# Patient Record
Sex: FEMALE | Race: WHITE | HISPANIC OR LATINO | Employment: FULL TIME | URBAN - METROPOLITAN AREA
[De-identification: names, ages, dates, MRNs, and addresses within clinical notes are randomized per-mention and may not be internally consistent; named-entity substitution may affect disease eponyms.]

---

## 2017-02-07 ENCOUNTER — HOSPITAL ENCOUNTER (EMERGENCY)
Facility: MEDICAL CENTER | Age: 27
End: 2017-02-07
Attending: EMERGENCY MEDICINE
Payer: COMMERCIAL

## 2017-02-07 VITALS
OXYGEN SATURATION: 100 % | HEIGHT: 60 IN | HEART RATE: 86 BPM | RESPIRATION RATE: 16 BRPM | BODY MASS INDEX: 30.25 KG/M2 | DIASTOLIC BLOOD PRESSURE: 85 MMHG | SYSTOLIC BLOOD PRESSURE: 123 MMHG | WEIGHT: 154.1 LBS | TEMPERATURE: 98.1 F

## 2017-02-07 DIAGNOSIS — S46.811A TRAPEZIUS STRAIN, RIGHT, INITIAL ENCOUNTER: ICD-10-CM

## 2017-02-07 PROCEDURE — 99284 EMERGENCY DEPT VISIT MOD MDM: CPT

## 2017-02-07 PROCEDURE — 700102 HCHG RX REV CODE 250 W/ 637 OVERRIDE(OP): Performed by: EMERGENCY MEDICINE

## 2017-02-07 PROCEDURE — A9270 NON-COVERED ITEM OR SERVICE: HCPCS | Performed by: EMERGENCY MEDICINE

## 2017-02-07 RX ORDER — DIAZEPAM 5 MG/1
5 TABLET ORAL ONCE
Status: COMPLETED | OUTPATIENT
Start: 2017-02-07 | End: 2017-02-07

## 2017-02-07 RX ORDER — NAPROXEN 500 MG/1
500 TABLET ORAL 2 TIMES DAILY WITH MEALS
Qty: 20 TAB | Refills: 0 | Status: SHIPPED | OUTPATIENT
Start: 2017-02-07 | End: 2021-10-21

## 2017-02-07 RX ORDER — HYDROCODONE BITARTRATE AND ACETAMINOPHEN 5; 325 MG/1; MG/1
1 TABLET ORAL ONCE
Status: COMPLETED | OUTPATIENT
Start: 2017-02-07 | End: 2017-02-07

## 2017-02-07 RX ORDER — CYCLOBENZAPRINE HCL 10 MG
10 TABLET ORAL 3 TIMES DAILY PRN
Qty: 20 TAB | Refills: 0 | Status: SHIPPED | OUTPATIENT
Start: 2017-02-07 | End: 2021-10-21

## 2017-02-07 RX ORDER — OXYCODONE HYDROCHLORIDE AND ACETAMINOPHEN 5; 325 MG/1; MG/1
1-2 TABLET ORAL EVERY 4 HOURS PRN
Qty: 12 TAB | Refills: 0 | Status: SHIPPED | OUTPATIENT
Start: 2017-02-07 | End: 2021-10-21

## 2017-02-07 RX ADMIN — DIAZEPAM 5 MG: 5 TABLET ORAL at 21:17

## 2017-02-07 RX ADMIN — HYDROCODONE BITARTRATE AND ACETAMINOPHEN 1 TABLET: 5; 325 TABLET ORAL at 21:17

## 2017-02-07 ASSESSMENT — PAIN SCALES - GENERAL: PAINLEVEL_OUTOF10: 8

## 2017-02-07 ASSESSMENT — LIFESTYLE VARIABLES: DO YOU DRINK ALCOHOL: NO

## 2017-02-07 NOTE — ED AVS SNAPSHOT
2/7/2017          Tavia Dong  [address]  [place] [address]    Dear Tavia:    [facility] wants to ensure your discharge home is safe and you or your loved ones have had all your questions answered regarding your care after you leave the hospital.    You may receive a telephone call within two days of your discharge.  This call is to make certain you understand your discharge instructions as well as ensure we provided you with the best care possible during your stay with us.     The call will only last approximately 3-5 minutes and will be done by a nurse.    Once again, we want to ensure your discharge home is safe and that you have a clear understanding of any next steps in your care.  If you have any questions or concerns, please do not hesitate to contact us, we are here for you.  Thank you for choosing [facility] for your healthcare needs.    Sincerely,    Yordy Posadas    [facility] [place] [facility]

## 2017-02-07 NOTE — LETTER
FORM C-4:  EMPLOYEE’S CLAIM FOR COMPENSATION/ REPORT OF INITIAL TREATMENT  EMPLOYEE’S CLAIM - PROVIDE ALL INFORMATION REQUESTED   First Name  Tavia Last Name  Berna Birthdate             Age  1990 27 y.o. 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 [address]] Zip  [address]   Date of Injury  2/6/2017       Hour of Injury  5:30 PM Date Employer Notified  2/6/2017 Last Day of Work after Injury or Occupational Disease  2/7/2017 Supervisor to Whom Injury Reported  ОЛЕГ CORNELIUS   Address or Location of Accident (if applicable)  [address]   What were you doing at the time of accident? (if applicable)  MOVING AN ENTERTAINMENT CENTER    How did this injury or occupational disease occur? Be specific and answer in detail. Use additional sheet if necessary)  MOVING A MEDIA CENTER/FIREPLACE BY HAND CART   If you believe that you have an occupational disease, when did you first have knowledge of the disability and it relationship to your employment?  N/A Witnesses to the Accident  NONE     Nature of Injury or Occupational Disease  Strain  Part(s) of Body Injured or Affected  Lower Back Area (Lumbar Area & Lumbo-Sacral), Upper Back Area (Thoracic Area), N/A    I certify that the above is true and correct to the best of my knowledge and that I have provided this information in order to  obtain the benefits of [place] or [place]  I hereby authorize any physician, chiropractor, surgeon, practitioner, or other person, any hospital, including [facility] hospital, any medical service organization, any insurance company, or other institution or organization to release to each other, any medical or other information, including benefits paid or payable, pertinent to this injury or disease, except information relative to diagnosis, treatment and/or counseling for AIDS, psychological conditions, alcohol or controlled substances, for which I must give specific authorization.  A Photostat of this authorization shall be as valid as the original.   Date  2/7/2017 Place  [facility] Employee’s Signature   THIS REPORT MUST BE COMPLETED AND MAILED WITHIN 3 WORKING DAYS OF TREATMENT   Place  [facility], EMERGENCY DEPT  Name of Facility   [facility]   Date  2/7/2017 Diagnosis  (S46.811A) Trapezius strain, right, initial encounter Is there evidence the injured employee was under the influence of alcohol and/or another controlled substance at the time of accident?   Hour  9:25 PM Description of Injury or Disease  Trapezius strain, right, initial encounter No   Treatment  Naproxen, Flexeril, Norco  Have you advised the patient to remain off work five days or more?         No   X-Ray Findings    Comments:not indicated   If Yes   From Date    To Date      From information given by the employee, together with medical evidence, can you directly connect this injury or occupational disease as job incurred?  Yes If No, is the employee capable of: Full Duty  No Modified Duty  Yes   Is additional medical care by a physician indicated?  Yes If Modified Duty, Specify any Limitations / Restrictions  As instructed by occupational medicine     Do you know of any previous  "injury or disease contributing to this condition or occupational disease?  No   Date  2/7/2017 Print Doctor’s Name  Hebert Kern certify the employer’s copy of this form was mailed on:   Address  [address] [address] [phone] Insurer’s Use Only   [facility]  [id number]    Provider’s Tax ID Number  [id number] Telephone  Dept: [phone]    Doctor’s Signature  e-SignHEBERT KERN M.D. Degree   MD    Original - TREATING PHYSICIAN OR CHIROPRACTOR   Pg 2-Insurer/TPA   Pg 3-Employer   Pg 4-Employee                                                                                                  Form C-4 (rev01/03)     BRIEF DESCRIPTION OF RIGHTS AND BENEFITS  (Pursuant to [place] 616C.050)    Notice of Injury or Occupational Disease (Incident Report Form C-1): If an injury or occupational disease (OD) arises out of and in the course of employment, you must provide written notice to your employer as soon as practicable, but no later than 7 days after the accident or OD. Your employer shall maintain a sufficient supply of the required forms.    Claim for Compensation (Form C-4): If medical treatment is sought, the form C-4 is available at the place of initial treatment. A completed \"Claim for Compensation\" (Form C-4) must be filed within 90 days after an accident or OD. The treating physician or chiropractor must, within 3 working days after treatment, complete and mail to the employer, the employer's insurer and third-party , the Claim for Compensation.    Medical Treatment: If you require medical treatment for your on-the-job injury or OD, you may be required to select a physician or chiropractor from a list provided by your workers’ compensation insurer, if it has contracted with an Organization for Managed Care (MCO) or Preferred Provider Organization (PPO) or providers of health care. If your employer has not entered into a contract with an MCO or PPO, you may " select a physician or chiropractor from the Panel of Physicians and Chiropractors. Any medical costs related to your industrial injury or OD will be paid by your insurer.    Temporary Total Disability (TTD): If your doctor has certified that you are unable to work for a period of at least 5 consecutive days, or 5 cumulative days in a 20-day period, or places restrictions on you that your employer does not accommodate, you may be entitled to TTD compensation.    Temporary Partial Disability (TPD): If the wage you receive upon reemployment is less than the compensation for TTD to which you are entitled, the insurer may be required to pay you TPD compensation to make up the difference. TPD can only be paid for a maximum of 24 months.    Permanent Partial Disability (PPD): When your medical condition is stable and there is an indication of a PPD as a result of your injury or OD, within 30 days, your insurer must arrange for an evaluation by a rating physician or chiropractor to determine the degree of your PPD. The amount of your PPD award depends on the date of injury, the results of the PPD evaluation and your age and wage.    Permanent Total Disability (PTD): If you are medically certified by a treating physician or chiropractor as permanently and totally disabled and have been granted a PTD status by your insurer, you are entitled to receive monthly benefits not to exceed 66 2/3% of your average monthly wage. The amount of your PTD payments is subject to reduction if you previously received a PPD award.    Vocational Rehabilitation Services: You may be eligible for vocational rehabilitation services if you are unable to return to the job due to a permanent physical impairment or permanent restrictions as a result of your injury or occupational disease.    Transportation and Per Amirah Reimbursement: You may be eligible for travel expenses and per amirah associated with medical treatment.  Reopening: You may be able to  reopen your claim if your condition worsens after claim closure.    Appeal Process: If you disagree with a written determination issued by the insurer or the insurer does not respond to your request, you may appeal to the Department of Administration, , by following the instructions contained in your determination letter. You must appeal the determination within 70 days from the date of the determination letter at [address], [address], [place], [place] [address], or [address], [address], [place], [place] [address]. If you disagree with the  decision, you may appeal to the Department of Administration, . You must file your appeal within 30 days from the date of the  decision letter at [address], [address], [place], [place] [address], or [address], [place], [place] [address]. If you disagree with a decision of an , you may file a petition for judicial review with the District Court. You must do so within 30 days of the Appeal Officer’s decision. You may be represented by an  at your own expense or you may contact the [facility] for possible representation.    [place]  for Injured Workers (NAIW): If you disagree with a  decision, you may request that NAIW represent you without charge at an  Hearing. For information regarding denial of benefits, you may contact the [facility] at: [address], [address], [place] [address], [phone], or [address], [place] [address], [phone]    To File a Complaint with the Division: If you wish to file a complaint with the  of the Division of Industrial Relations (DIR), please contact the Workers’ Compensation Section, [address], [address], [place], [place] [address], telephone [phone], or [address], [place]  [address], telephone [phone].    For assistance with Workers’ Compensation Issues: you may contact the Office of the Governor Consumer Health Assistance, [address] [place] [address], [facility] [address], [place] [address], Toll Free 1-129.223.7199, Web site: [email or web address]., E-mail [email or web address]                                                                                                                                                                               __________________________________________________________________                                    _______2/7/2017__________            Employee Name / Signature                                                                                                                            Date                                       D-2 (rev. 10/07)

## 2017-02-07 NOTE — ED AVS SNAPSHOT
Home Care Instructions                                                                                                                Tavia Dong   MRN: [id number]    Department:  [facility], Emergency Dept   Date of Visit:  2/7/2017            [facility], Emergency Dept    [address]    [place] [address]    Phone:  [phone]      You were seen by     Hebert Kern M.D.      Your Diagnosis Was     Trapezius strain, right, initial encounter     S46.811A       These are the medications you received during your hospitalization from 02/07/2017 1954 to 02/07/2017 2136     Date/Time Order Dose Route Action    02/07/2017 2117 diazepam (VALIUM) tablet 5 mg 5 mg Oral Given    02/07/2017 2117 hydrocodone-acetaminophen (NORCO) 5-325 MG per tablet 1 Tab 1 Tab Oral Given      Follow-up Information     1. Follow up with [facility] In 2 days.    Contact information    [address]  [place] [address]  [phone]        Medication Information     Review all of your home medications and newly ordered medications with your primary doctor and/or pharmacist as soon as possible. Follow medication instructions as directed by your doctor and/or pharmacist.     Please keep your complete medication list with you and share with your physician. Update the information when medications are discontinued, doses are changed, or new medications (including over-the-counter products) are added; and carry medication information at all times in the event of emergency situations.               Medication List      START taking these medications        Instructions    cyclobenzaprine 10 MG Tabs   Commonly known as:  FLEXERIL    Take 1 Tab by mouth 3 times a day as needed.   Dose:  10 mg       naproxen 500 MG Tabs   Commonly known as:  NAPROSYN    Take 1 Tab by mouth 2 times a day, with meals.   Dose:  500 mg       oxycodone-acetaminophen 5-325 MG Tabs   Commonly known as:  PERCOCET       Take 1-2 Tabs by mouth every four hours as needed for Moderate Pain.   Dose:  1-2 Tab                 Discharge Instructions       Return immediately to the Emergency Department if you experience continuing or worsening discomfort in your back and neck, any numbness/weakness/tingling, abdominal pain, fever, chest pain, difficulty breathing or any other new or worsening symptoms.      Muscle Strain  A muscle strain is an injury that occurs when a muscle is stretched beyond its normal length. Usually a small number of muscle fibers are torn when this happens. Muscle strain is rated in degrees. First-degree strains have the least amount of muscle fiber tearing and pain. Second-degree and third-degree strains have increasingly more tearing and pain.   Usually, recovery from muscle strain takes 1-2 weeks. Complete healing takes 5-6 weeks.   CAUSES   Muscle strain happens when a sudden, violent force placed on a muscle stretches it too far. This may occur with lifting, sports, or a fall.   RISK FACTORS  Muscle strain is especially common in athletes.   SIGNS AND SYMPTOMS  At the site of the muscle strain, there may be:  · Pain.  · Bruising.  · Swelling.  · Difficulty using the muscle due to pain or lack of normal function.  DIAGNOSIS   Your health care provider will perform a physical exam and ask about your medical history.  TREATMENT   Often, the best treatment for a muscle strain is resting, icing, and applying cold compresses to the injured area.    HOME CARE INSTRUCTIONS   · Use the PRICE method of treatment to promote muscle healing during the first 2-3 days after your injury. The PRICE method involves:  ¨ Protecting the muscle from being injured again.  ¨ Restricting your activity and resting the injured body part.  ¨ Icing your injury. To do this, put ice in a plastic bag. Place a towel between your skin and the bag. Then, apply the ice and leave it on from 15-20 minutes each hour. After the third day, switch  to moist heat packs.  ¨ Apply compression to the injured area with a splint or elastic bandage. Be careful not to wrap it too tightly. This may interfere with blood circulation or increase swelling.  ¨ Elevate the injured body part above the level of your heart as often as you can.  · Only take over-the-counter or prescription medicines for pain, discomfort, or fever as directed by your health care provider.  · Warming up prior to exercise helps to prevent future muscle strains.  SEEK MEDICAL CARE IF:   · You have increasing pain or swelling in the injured area.  · You have numbness, tingling, or a significant loss of strength in the injured area.  MAKE SURE YOU:   · Understand these instructions.  · Will watch your condition.  · Will get help right away if you are not doing well or get worse.     This information is not intended to replace advice given to you by your health care provider. Make sure you discuss any questions you have with your health care provider.     Document Released: 12/18/2006 Document Revised: 10/08/2014 Document Reviewed: 07/17/2014  [employer] Interactive Patient Education ©2016 [employer]            Patient Information     Patient Information    Following emergency treatment: all patient requiring follow-up care must return either to a private physician or a clinic if your condition worsens before you are able to obtain further medical attention, please return to the emergency room.     Billing Information    At [facility], we work to make the billing process streamlined for our patients.  Our Representatives are here to answer any questions you may have regarding your hospital bill.  If you have insurance coverage and have supplied your insurance information to us, we will submit a claim to your insurer on your behalf.  Should you have any questions regarding your bill, we can be reached online or by phone as follows:  Online: You are able pay your bills online or live chat with our  representatives about any billing questions you may have. We are here to help Monday - Friday from 8:00am to 7:30pm and 9:00am - 12:00pm on Saturdays.  Please visit [email or web address]  for more information.   Phone:  [phone] or [phone]    Please note that your emergency physician, surgeon, pathologist, radiologist, anesthesiologist, and other specialists are not employed by [facility] and will therefore bill separately for their services.  Please contact them directly for any questions concerning their bills at the numbers below:     Emergency Physician Services:  [phone]  [facility]:  [phone]  [facility]:  [phone]  [facility]:  [phone]    1. Your final bill may vary from the amount quoted upon discharge if all procedures are not complete at that time, or if your doctor has additional procedures of which we are not aware. You will receive an additional bill if you return to the Emergency Department at [facility] for suture removal regardless of the facility of which the sutures were placed.     2. Please arrange for settlement of this account at the emergency registration.    3. All self-pay accounts are due in full at the time of treatment.  If you are unable to meet this obligation then payment is expected within 4-5 days.     4. If you have had radiology studies (CT, X-ray, Ultrasound, MRI), you have received a preliminary result during your emergency department visit. Please contact the radiology department [phone] to receive a copy of your final result. Please discuss the Final result with your primary physician or with the follow up physician provided.     Crisis Hotline:  [place] Crisis Hotline:  8-538-VJHUXON or 1-318.630.3897  [place] Crisis Hotline:    1-887.190.7386 or 477-337-4767         ED Discharge Follow Up Questions    1. In order to provide you with very good care, we would  like to follow up with a phone call in the next few days.  May we have your permission to contact you?     YES /  NO    2. What is the best phone number to call you? (       )_____-__________    3. What is the best time to call you?      Morning  /  Afternoon  /  Evening                   Patient Signature:  ____________________________________________________________    Date:  ____________________________________________________________

## 2017-02-07 NOTE — ED AVS SNAPSHOT
[employer] Access Code: [email or web address]  Expires: 3/9/2017  9:36 PM    [employer]  A secure, online tool to manage your health information     [employer]s [employer]® is a secure, online tool that connects you to your personalized health information from the privacy of your home -- day or night - making it very easy for you to manage your healthcare. Once the activation process is completed, you can even access your medical information using the [employer] margy, which is available for free in the Apple Margy store or Google Play store.     [employer] provides the following levels of access (as shown below):   My Chart Features   [facility] Primary Care Doctor [facility]  Specialists [facility]  Urgent  Care Non-[facility]  Primary Care  Doctor   Email your healthcare team securely and privately 24/7 X X X X   Manage appointments: schedule your next appointment; view details of past/upcoming appointments X      Request prescription refills. X      View recent personal medical records, including lab and immunizations X X X X   View health record, including health history, allergies, medications X X X X   Read reports about your outpatient visits, procedures, consult and ER notes X X X X   See your discharge summary, which is a recap of your hospital and/or ER visit that includes your diagnosis, lab results, and care plan. X X       How to register for [employer]:  1. Go to  [email or web address].  2. Click on the Sign Up Now box, which takes you to the New Member Sign Up page. You will need to provide the following information:  a. Enter your [employer] Access Code exactly as it appears at the top of this page. (You will not need to use this code after you’ve completed the sign-up process. If you do not sign up before the expiration date, you must request a new code.)   b. Enter your date of birth.   c. Enter your home email address.   d. Click Submit, and follow the next screen’s instructions.  3. Create a [employer] ID. This will be your [employer]  login ID and cannot be changed, so think of one that is secure and easy to remember.  4. Create a "[employer]" password. You can change your password at any time.  5. Enter your Password Reset Question and Answer. This can be used at a later time if you forget your password.   6. Enter your e-mail address. This allows you to receive e-mail notifications when new information is available in "[employer]".  7. Click Sign Up. You can now view your health information.    For assistance activating your "[employer]" account, call [phone]

## 2017-02-08 NOTE — ED PROVIDER NOTES
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  pain has become increasingly worse.       HPI  Tavia Dong is a 27 y.o. female who presents for evaluation of right-sided neck and upper back pain that began yesterday while she was moving some heavy stuff at work. She reports the pain wasn't so bad yesterday but after waking up this morning it's been much worse. No focal weakness numbness or tingling. No low back pain. No chest pain or abdominal pain. She reports a stiffness is progressively severe as well.    REVIEW OF SYSTEMS  Negative for fever, rash, chest pain, dyspnea, abdominal pain.    PAST MEDICAL HISTORY   has a past medical history of GERD (gastroesophageal reflux disease) and Headache(784.0).    SOCIAL HISTORY  Social History     Social History Main Topics   • Smoking status: Never Smoker    • Smokeless tobacco: Not on file   • Alcohol Use: No   • Drug Use: No   • Sexual Activity:     Partners: Male      Comment: none       SURGICAL HISTORY   has past surgical history that includes cholecystectomy (2013).    CURRENT MEDICATIONS  I personally reviewed the medication list in the charting documentation.     ALLERGIES  No Known Allergies    PHYSICAL EXAM  VITAL SIGNS: /85 mmHg  Pulse 77  Temp(Src) 36.7 °C (98.1 °F)  Resp 16  Ht 1.524 m (5')  Wt 69.9 kg (154 lb 1.6 oz)  BMI 30.10 kg/m2  SpO2 100%  LMP 08/20/2014  Constitutional: Alert in no apparent distress.  HENT: No signs of trauma.   Neck: Tenderness involving the entire right trapezius muscle with no midline tenderness.  Eyes: Conjunctiva normal, Non-icteric.   Chest: Normal nonlabored respirations  Skin: No erythema, No rash.   Musculoskeletal: Good range of motion in all major joints.   Neurologic: Alert, No focal deficits noted.   Psychiatric: Affect normal, Judgment normal.    COURSE & MEDICAL DECISION MAKING  Pertinent Labs &  Imaging studies reviewed. (See chart for details)    Encounter Summary: This is a 27 y.o. female with a presentation classic for right trapezius strain, associated tenderness on exam, no midline findings and no focal neurologic findings or complaints. We'll treat her with Valium and Norco here in the emergency department, she has had a query of her narcotic database, she will be prescribed Flexeril and Percocet for at home as well as naproxen. Strict return structures given. She will follow up with occupational medicine.      DISPOSITION: Discharge Home      FINAL IMPRESSION  1. Trapezius strain, right, initial encounter        This dictation was created using voice recognition software. The accuracy of the dictation is limited to the abilities of the software. I expect there may be some errors of grammar and possibly content. The nursing notes were reviewed and certain aspects of this information were incorporated into this note.    Electronically signed by: Hebert Kern, 2/7/2017 9:16 PM

## 2017-02-08 NOTE — ED NOTES
Ambulates to room hunched forward but with steady gait. Agree with triage note. Right sided low back tenderness, CMS intact. Chart up for ERP.

## 2017-02-08 NOTE — ED NOTES
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  pain has become increasingly worse.     denies distal numbness or tingling.  Appears to me in minimal distress.  Triage process explained to patient.  Pt back to waiting room.  Pt instructed to inform RN if any changes or questions arise.

## 2021-10-21 ENCOUNTER — APPOINTMENT (OUTPATIENT)
Dept: RADIOLOGY | Facility: MEDICAL CENTER | Age: 31
End: 2021-10-21
Payer: MEDICAID

## 2021-10-21 ENCOUNTER — APPOINTMENT (OUTPATIENT)
Dept: RADIOLOGY | Facility: MEDICAL CENTER | Age: 31
End: 2021-10-21
Attending: EMERGENCY MEDICINE
Payer: MEDICAID

## 2021-10-21 ENCOUNTER — HOSPITAL ENCOUNTER (OUTPATIENT)
Facility: MEDICAL CENTER | Age: 31
End: 2021-10-22
Attending: EMERGENCY MEDICINE | Admitting: STUDENT IN AN ORGANIZED HEALTH CARE EDUCATION/TRAINING PROGRAM
Payer: MEDICAID

## 2021-10-21 DIAGNOSIS — R29.898 WEAKNESS OF LEFT UPPER EXTREMITY: ICD-10-CM

## 2021-10-21 DIAGNOSIS — R07.81 PLEURITIC CHEST PAIN: ICD-10-CM

## 2021-10-21 DIAGNOSIS — Q21.12 PFO (PATENT FORAMEN OVALE): ICD-10-CM

## 2021-10-21 DIAGNOSIS — E87.6 HYPOKALEMIA: ICD-10-CM

## 2021-10-21 DIAGNOSIS — R94.31 EKG ABNORMALITIES: ICD-10-CM

## 2021-10-21 LAB
ALBUMIN SERPL BCP-MCNC: 5 G/DL (ref 3.2–4.9)
ALBUMIN/GLOB SERPL: 1.8 G/DL
ALP SERPL-CCNC: 97 U/L (ref 30–99)
ALT SERPL-CCNC: 16 U/L (ref 2–50)
ANION GAP SERPL CALC-SCNC: 11 MMOL/L (ref 7–16)
AST SERPL-CCNC: 12 U/L (ref 12–45)
BASOPHILS # BLD AUTO: 0.3 % (ref 0–1.8)
BASOPHILS # BLD: 0.03 K/UL (ref 0–0.12)
BILIRUB SERPL-MCNC: 0.6 MG/DL (ref 0.1–1.5)
BUN SERPL-MCNC: 7 MG/DL (ref 8–22)
CALCIUM SERPL-MCNC: 9.5 MG/DL (ref 8.5–10.5)
CHLORIDE SERPL-SCNC: 105 MMOL/L (ref 96–112)
CO2 SERPL-SCNC: 25 MMOL/L (ref 20–33)
CREAT SERPL-MCNC: 0.73 MG/DL (ref 0.5–1.4)
EKG IMPRESSION: NORMAL
EKG IMPRESSION: NORMAL
EOSINOPHIL # BLD AUTO: 0.09 K/UL (ref 0–0.51)
EOSINOPHIL NFR BLD: 1 % (ref 0–6.9)
ERYTHROCYTE [DISTWIDTH] IN BLOOD BY AUTOMATED COUNT: 42 FL (ref 35.9–50)
EST. AVERAGE GLUCOSE BLD GHB EST-MCNC: 85 MG/DL
ETHANOL BLD-MCNC: <10.1 MG/DL (ref 0–10)
GLOBULIN SER CALC-MCNC: 2.8 G/DL (ref 1.9–3.5)
GLUCOSE SERPL-MCNC: 105 MG/DL (ref 65–99)
HBA1C MFR BLD: 4.6 % (ref 4–5.6)
HCG SERPL QL: NEGATIVE
HCT VFR BLD AUTO: 43.8 % (ref 37–47)
HGB BLD-MCNC: 14.6 G/DL (ref 12–16)
IMM GRANULOCYTES # BLD AUTO: 0.05 K/UL (ref 0–0.11)
IMM GRANULOCYTES NFR BLD AUTO: 0.6 % (ref 0–0.9)
INR PPP: 1.07 (ref 0.87–1.13)
LYMPHOCYTES # BLD AUTO: 1.98 K/UL (ref 1–4.8)
LYMPHOCYTES NFR BLD: 22.4 % (ref 22–41)
MAGNESIUM SERPL-MCNC: 2.1 MG/DL (ref 1.5–2.5)
MAGNESIUM SERPL-MCNC: 2.2 MG/DL (ref 1.5–2.5)
MCH RBC QN AUTO: 29.3 PG (ref 27–33)
MCHC RBC AUTO-ENTMCNC: 33.3 G/DL (ref 33.6–35)
MCV RBC AUTO: 88 FL (ref 81.4–97.8)
MONOCYTES # BLD AUTO: 0.26 K/UL (ref 0–0.85)
MONOCYTES NFR BLD AUTO: 2.9 % (ref 0–13.4)
NEUTROPHILS # BLD AUTO: 6.42 K/UL (ref 2–7.15)
NEUTROPHILS NFR BLD: 72.8 % (ref 44–72)
NRBC # BLD AUTO: 0 K/UL
NRBC BLD-RTO: 0 /100 WBC
PLATELET # BLD AUTO: 349 K/UL (ref 164–446)
PMV BLD AUTO: 9 FL (ref 9–12.9)
POTASSIUM SERPL-SCNC: 2.8 MMOL/L (ref 3.6–5.5)
PROT SERPL-MCNC: 7.8 G/DL (ref 6–8.2)
PROTHROMBIN TIME: 13.6 SEC (ref 12–14.6)
RBC # BLD AUTO: 4.98 M/UL (ref 4.2–5.4)
SODIUM SERPL-SCNC: 141 MMOL/L (ref 135–145)
TROPONIN T SERPL-MCNC: <6 NG/L (ref 6–19)
TROPONIN T SERPL-MCNC: <6 NG/L (ref 6–19)
WBC # BLD AUTO: 8.8 K/UL (ref 4.8–10.8)

## 2021-10-21 PROCEDURE — 96365 THER/PROPH/DIAG IV INF INIT: CPT

## 2021-10-21 PROCEDURE — 99220 PR INITIAL OBSERVATION CARE,LEVL III: CPT | Performed by: STUDENT IN AN ORGANIZED HEALTH CARE EDUCATION/TRAINING PROGRAM

## 2021-10-21 PROCEDURE — 93005 ELECTROCARDIOGRAM TRACING: CPT | Performed by: EMERGENCY MEDICINE

## 2021-10-21 PROCEDURE — 85610 PROTHROMBIN TIME: CPT

## 2021-10-21 PROCEDURE — 84703 CHORIONIC GONADOTROPIN ASSAY: CPT

## 2021-10-21 PROCEDURE — 70450 CT HEAD/BRAIN W/O DYE: CPT

## 2021-10-21 PROCEDURE — A9270 NON-COVERED ITEM OR SERVICE: HCPCS | Performed by: EMERGENCY MEDICINE

## 2021-10-21 PROCEDURE — 71045 X-RAY EXAM CHEST 1 VIEW: CPT

## 2021-10-21 PROCEDURE — 85025 COMPLETE CBC W/AUTO DIFF WBC: CPT

## 2021-10-21 PROCEDURE — 80053 COMPREHEN METABOLIC PANEL: CPT

## 2021-10-21 PROCEDURE — 93005 ELECTROCARDIOGRAM TRACING: CPT

## 2021-10-21 PROCEDURE — 96366 THER/PROPH/DIAG IV INF ADDON: CPT

## 2021-10-21 PROCEDURE — 83735 ASSAY OF MAGNESIUM: CPT

## 2021-10-21 PROCEDURE — 700111 HCHG RX REV CODE 636 W/ 250 OVERRIDE (IP): Performed by: EMERGENCY MEDICINE

## 2021-10-21 PROCEDURE — 83036 HEMOGLOBIN GLYCOSYLATED A1C: CPT

## 2021-10-21 PROCEDURE — 700102 HCHG RX REV CODE 250 W/ 637 OVERRIDE(OP): Performed by: EMERGENCY MEDICINE

## 2021-10-21 PROCEDURE — 84484 ASSAY OF TROPONIN QUANT: CPT

## 2021-10-21 PROCEDURE — G0378 HOSPITAL OBSERVATION PER HR: HCPCS

## 2021-10-21 PROCEDURE — 94760 N-INVAS EAR/PLS OXIMETRY 1: CPT

## 2021-10-21 PROCEDURE — 99285 EMERGENCY DEPT VISIT HI MDM: CPT

## 2021-10-21 PROCEDURE — 82077 ASSAY SPEC XCP UR&BREATH IA: CPT

## 2021-10-21 RX ORDER — POTASSIUM CHLORIDE 7.45 MG/ML
10 INJECTION INTRAVENOUS ONCE
Status: DISCONTINUED | OUTPATIENT
Start: 2021-10-21 | End: 2021-10-21

## 2021-10-21 RX ORDER — ONDANSETRON 4 MG/1
4 TABLET, ORALLY DISINTEGRATING ORAL EVERY 4 HOURS PRN
Status: DISCONTINUED | OUTPATIENT
Start: 2021-10-21 | End: 2021-10-22 | Stop reason: HOSPADM

## 2021-10-21 RX ORDER — PROMETHAZINE HYDROCHLORIDE 12.5 MG/1
12.5-25 SUPPOSITORY RECTAL EVERY 4 HOURS PRN
Status: DISCONTINUED | OUTPATIENT
Start: 2021-10-21 | End: 2021-10-22 | Stop reason: HOSPADM

## 2021-10-21 RX ORDER — LABETALOL HYDROCHLORIDE 5 MG/ML
10 INJECTION, SOLUTION INTRAVENOUS
Status: DISCONTINUED | OUTPATIENT
Start: 2021-10-21 | End: 2021-10-22 | Stop reason: HOSPADM

## 2021-10-21 RX ORDER — ONDANSETRON 2 MG/ML
4 INJECTION INTRAMUSCULAR; INTRAVENOUS EVERY 4 HOURS PRN
Status: DISCONTINUED | OUTPATIENT
Start: 2021-10-21 | End: 2021-10-22 | Stop reason: HOSPADM

## 2021-10-21 RX ORDER — AMOXICILLIN 250 MG
2 CAPSULE ORAL 2 TIMES DAILY
Status: DISCONTINUED | OUTPATIENT
Start: 2021-10-21 | End: 2021-10-22 | Stop reason: HOSPADM

## 2021-10-21 RX ORDER — POTASSIUM CHLORIDE 20 MEQ/1
40 TABLET, EXTENDED RELEASE ORAL ONCE
Status: COMPLETED | OUTPATIENT
Start: 2021-10-21 | End: 2021-10-21

## 2021-10-21 RX ORDER — HYDRALAZINE HYDROCHLORIDE 20 MG/ML
10 INJECTION INTRAMUSCULAR; INTRAVENOUS
Status: DISCONTINUED | OUTPATIENT
Start: 2021-10-21 | End: 2021-10-22 | Stop reason: HOSPADM

## 2021-10-21 RX ORDER — POLYETHYLENE GLYCOL 3350 17 G/17G
1 POWDER, FOR SOLUTION ORAL
Status: DISCONTINUED | OUTPATIENT
Start: 2021-10-21 | End: 2021-10-22 | Stop reason: HOSPADM

## 2021-10-21 RX ORDER — PROCHLORPERAZINE EDISYLATE 5 MG/ML
5-10 INJECTION INTRAMUSCULAR; INTRAVENOUS EVERY 4 HOURS PRN
Status: DISCONTINUED | OUTPATIENT
Start: 2021-10-21 | End: 2021-10-22 | Stop reason: HOSPADM

## 2021-10-21 RX ORDER — ASPIRIN 81 MG/1
324 TABLET, CHEWABLE ORAL DAILY
Status: DISCONTINUED | OUTPATIENT
Start: 2021-10-22 | End: 2021-10-22 | Stop reason: HOSPADM

## 2021-10-21 RX ORDER — POTASSIUM CHLORIDE 7.45 MG/ML
10 INJECTION INTRAVENOUS ONCE
Status: COMPLETED | OUTPATIENT
Start: 2021-10-21 | End: 2021-10-21

## 2021-10-21 RX ORDER — ENALAPRILAT 1.25 MG/ML
1.25 INJECTION INTRAVENOUS EVERY 6 HOURS PRN
Status: DISCONTINUED | OUTPATIENT
Start: 2021-10-21 | End: 2021-10-21

## 2021-10-21 RX ORDER — LABETALOL HYDROCHLORIDE 5 MG/ML
10 INJECTION, SOLUTION INTRAVENOUS EVERY 4 HOURS PRN
Status: DISCONTINUED | OUTPATIENT
Start: 2021-10-21 | End: 2021-10-21

## 2021-10-21 RX ORDER — ASPIRIN 300 MG/1
300 SUPPOSITORY RECTAL DAILY
Status: DISCONTINUED | OUTPATIENT
Start: 2021-10-22 | End: 2021-10-22 | Stop reason: HOSPADM

## 2021-10-21 RX ORDER — PROMETHAZINE HYDROCHLORIDE 25 MG/1
12.5-25 TABLET ORAL EVERY 4 HOURS PRN
Status: DISCONTINUED | OUTPATIENT
Start: 2021-10-21 | End: 2021-10-22 | Stop reason: HOSPADM

## 2021-10-21 RX ORDER — ASPIRIN 325 MG
325 TABLET ORAL DAILY
Status: DISCONTINUED | OUTPATIENT
Start: 2021-10-22 | End: 2021-10-22 | Stop reason: HOSPADM

## 2021-10-21 RX ORDER — BISACODYL 10 MG
10 SUPPOSITORY, RECTAL RECTAL
Status: DISCONTINUED | OUTPATIENT
Start: 2021-10-21 | End: 2021-10-22 | Stop reason: HOSPADM

## 2021-10-21 RX ORDER — HEPARIN SODIUM 5000 [USP'U]/ML
5000 INJECTION, SOLUTION INTRAVENOUS; SUBCUTANEOUS EVERY 8 HOURS
Status: DISCONTINUED | OUTPATIENT
Start: 2021-10-21 | End: 2021-10-22 | Stop reason: HOSPADM

## 2021-10-21 RX ADMIN — POTASSIUM CHLORIDE 40 MEQ: 1500 TABLET, EXTENDED RELEASE ORAL at 18:28

## 2021-10-21 RX ADMIN — POTASSIUM CHLORIDE 10 MEQ: 7.46 INJECTION, SOLUTION INTRAVENOUS at 19:50

## 2021-10-21 RX ADMIN — POTASSIUM CHLORIDE 10 MEQ: 10 INJECTION, SOLUTION INTRAVENOUS at 18:34

## 2021-10-21 ASSESSMENT — ENCOUNTER SYMPTOMS
TINGLING: 1
COUGH: 0
SHORTNESS OF BREATH: 1
FEVER: 0

## 2021-10-21 NOTE — ED TRIAGE NOTES
Chief Complaint   Patient presents with   • Tingling     Pt states left arm tingling when she woke up today. pt reports to have gone to bed normal, denies injury. CMS intact but states it feels llike her arm is asleep.    • Chest Pressure     very mild, middle of chest     Explained to pt triage process, made pt aware to tell this RN/staff of any changes/concerns, pt verbalized understanding of process and instructions given. Pt to ER lobby.

## 2021-10-22 ENCOUNTER — APPOINTMENT (OUTPATIENT)
Dept: RADIOLOGY | Facility: MEDICAL CENTER | Age: 31
End: 2021-10-22
Attending: STUDENT IN AN ORGANIZED HEALTH CARE EDUCATION/TRAINING PROGRAM
Payer: MEDICAID

## 2021-10-22 ENCOUNTER — PATIENT OUTREACH (OUTPATIENT)
Dept: HEALTH INFORMATION MANAGEMENT | Facility: OTHER | Age: 31
End: 2021-10-22

## 2021-10-22 ENCOUNTER — APPOINTMENT (OUTPATIENT)
Dept: CARDIOLOGY | Facility: MEDICAL CENTER | Age: 31
End: 2021-10-22
Attending: STUDENT IN AN ORGANIZED HEALTH CARE EDUCATION/TRAINING PROGRAM
Payer: MEDICAID

## 2021-10-22 VITALS
WEIGHT: 178.57 LBS | HEART RATE: 81 BPM | TEMPERATURE: 97.9 F | HEIGHT: 60 IN | BODY MASS INDEX: 35.06 KG/M2 | SYSTOLIC BLOOD PRESSURE: 117 MMHG | RESPIRATION RATE: 16 BRPM | DIASTOLIC BLOOD PRESSURE: 70 MMHG | OXYGEN SATURATION: 97 %

## 2021-10-22 PROBLEM — G45.9 TIA (TRANSIENT ISCHEMIC ATTACK): Status: RESOLVED | Noted: 2021-10-22 | Resolved: 2021-10-22

## 2021-10-22 PROBLEM — G45.9 TIA (TRANSIENT ISCHEMIC ATTACK): Status: ACTIVE | Noted: 2021-10-22

## 2021-10-22 LAB
ALBUMIN SERPL BCP-MCNC: 4.3 G/DL (ref 3.2–4.9)
ALBUMIN/GLOB SERPL: 1.8 G/DL
ALP SERPL-CCNC: 84 U/L (ref 30–99)
ALT SERPL-CCNC: 12 U/L (ref 2–50)
ANION GAP SERPL CALC-SCNC: 10 MMOL/L (ref 7–16)
AST SERPL-CCNC: 9 U/L (ref 12–45)
BILIRUB SERPL-MCNC: 0.7 MG/DL (ref 0.1–1.5)
BUN SERPL-MCNC: 6 MG/DL (ref 8–22)
CALCIUM SERPL-MCNC: 8.6 MG/DL (ref 8.5–10.5)
CHLORIDE SERPL-SCNC: 108 MMOL/L (ref 96–112)
CHOLEST SERPL-MCNC: 132 MG/DL (ref 100–199)
CO2 SERPL-SCNC: 22 MMOL/L (ref 20–33)
CREAT SERPL-MCNC: 0.73 MG/DL (ref 0.5–1.4)
D DIMER PPP IA.FEU-MCNC: 0.27 UG/ML (FEU) (ref 0–0.5)
ERYTHROCYTE [DISTWIDTH] IN BLOOD BY AUTOMATED COUNT: 42.9 FL (ref 35.9–50)
GLOBULIN SER CALC-MCNC: 2.4 G/DL (ref 1.9–3.5)
GLUCOSE SERPL-MCNC: 93 MG/DL (ref 65–99)
HCT VFR BLD AUTO: 40.1 % (ref 37–47)
HDLC SERPL-MCNC: 29 MG/DL
HGB BLD-MCNC: 13.3 G/DL (ref 12–16)
LDLC SERPL CALC-MCNC: 86 MG/DL
LV EJECT FRACT  99904: 65
MCH RBC QN AUTO: 29.2 PG (ref 27–33)
MCHC RBC AUTO-ENTMCNC: 33.2 G/DL (ref 33.6–35)
MCV RBC AUTO: 88.1 FL (ref 81.4–97.8)
PLATELET # BLD AUTO: 295 K/UL (ref 164–446)
PMV BLD AUTO: 8.8 FL (ref 9–12.9)
POTASSIUM SERPL-SCNC: 3.6 MMOL/L (ref 3.6–5.5)
PROT SERPL-MCNC: 6.7 G/DL (ref 6–8.2)
RBC # BLD AUTO: 4.55 M/UL (ref 4.2–5.4)
SODIUM SERPL-SCNC: 140 MMOL/L (ref 135–145)
TRIGL SERPL-MCNC: 84 MG/DL (ref 0–149)
TROPONIN T SERPL-MCNC: <6 NG/L (ref 6–19)
WBC # BLD AUTO: 7.9 K/UL (ref 4.8–10.8)

## 2021-10-22 PROCEDURE — 700117 HCHG RX CONTRAST REV CODE 255: Performed by: STUDENT IN AN ORGANIZED HEALTH CARE EDUCATION/TRAINING PROGRAM

## 2021-10-22 PROCEDURE — 85379 FIBRIN DEGRADATION QUANT: CPT

## 2021-10-22 PROCEDURE — 93306 TTE W/DOPPLER COMPLETE: CPT | Mod: 26 | Performed by: INTERNAL MEDICINE

## 2021-10-22 PROCEDURE — 700102 HCHG RX REV CODE 250 W/ 637 OVERRIDE(OP): Performed by: STUDENT IN AN ORGANIZED HEALTH CARE EDUCATION/TRAINING PROGRAM

## 2021-10-22 PROCEDURE — 93306 TTE W/DOPPLER COMPLETE: CPT

## 2021-10-22 PROCEDURE — 700111 HCHG RX REV CODE 636 W/ 250 OVERRIDE (IP): Performed by: STUDENT IN AN ORGANIZED HEALTH CARE EDUCATION/TRAINING PROGRAM

## 2021-10-22 PROCEDURE — 97165 OT EVAL LOW COMPLEX 30 MIN: CPT

## 2021-10-22 PROCEDURE — G0378 HOSPITAL OBSERVATION PER HR: HCPCS

## 2021-10-22 PROCEDURE — 99217 PR OBSERVATION CARE DISCHARGE: CPT | Performed by: HOSPITALIST

## 2021-10-22 PROCEDURE — 84484 ASSAY OF TROPONIN QUANT: CPT

## 2021-10-22 PROCEDURE — 85027 COMPLETE CBC AUTOMATED: CPT

## 2021-10-22 PROCEDURE — 97161 PT EVAL LOW COMPLEX 20 MIN: CPT

## 2021-10-22 PROCEDURE — A9270 NON-COVERED ITEM OR SERVICE: HCPCS | Performed by: STUDENT IN AN ORGANIZED HEALTH CARE EDUCATION/TRAINING PROGRAM

## 2021-10-22 PROCEDURE — 96372 THER/PROPH/DIAG INJ SC/IM: CPT | Mod: XU

## 2021-10-22 PROCEDURE — 70498 CT ANGIOGRAPHY NECK: CPT

## 2021-10-22 PROCEDURE — 70496 CT ANGIOGRAPHY HEAD: CPT

## 2021-10-22 PROCEDURE — 70551 MRI BRAIN STEM W/O DYE: CPT

## 2021-10-22 PROCEDURE — 80061 LIPID PANEL: CPT

## 2021-10-22 PROCEDURE — 80053 COMPREHEN METABOLIC PANEL: CPT

## 2021-10-22 RX ADMIN — IOHEXOL 80 ML: 350 INJECTION, SOLUTION INTRAVENOUS at 05:00

## 2021-10-22 RX ADMIN — ASPIRIN 324 MG: 81 TABLET, CHEWABLE ORAL at 07:26

## 2021-10-22 RX ADMIN — HEPARIN SODIUM 5000 UNITS: 5000 INJECTION, SOLUTION INTRAVENOUS; SUBCUTANEOUS at 07:26

## 2021-10-22 ASSESSMENT — COGNITIVE AND FUNCTIONAL STATUS - GENERAL
SUGGESTED CMS G CODE MODIFIER MOBILITY: CH
DAILY ACTIVITIY SCORE: 24
MOBILITY SCORE: 24
SUGGESTED CMS G CODE MODIFIER DAILY ACTIVITY: CH

## 2021-10-22 ASSESSMENT — GAIT ASSESSMENTS
DISTANCE (FEET): 200
GAIT LEVEL OF ASSIST: SUPERVISED

## 2021-10-22 ASSESSMENT — FIBROSIS 4 INDEX: FIB4 SCORE: 0.28

## 2021-10-22 ASSESSMENT — ACTIVITIES OF DAILY LIVING (ADL): TOILETING: INDEPENDENT

## 2021-10-22 ASSESSMENT — PAIN DESCRIPTION - PAIN TYPE
TYPE: ACUTE PAIN
TYPE: ACUTE PAIN

## 2021-10-22 NOTE — DISCHARGE PLANNING
[facility] Transitional [facility] Coordination    Referral from:  Dr Byrd  Insurance Provider on Facesheet: [employer]  Potential Rehab Diagnosis: Stroke    Chart review indicates patient may need on going medical management and may have therapy needs to possibly meet inpatient rehab facility criteria with the goal of returning to community.    D/C support: TBD     Physiatry consultation: Pended per protocol.     Last Covid test:  Pending    TIA - pending therapy evals. Waiting on additional information to determine appropriateness for acute inpatient rehabilitation. Will continue to follow.      Thank you for the referral.

## 2021-10-22 NOTE — DISCHARGE INSTRUCTIONS
Discharge Instructions    Discharged to home by car with relative. Discharged via wheelchair, hospital escort: Yes.  Special equipment needed: Not Applicable    Be sure to schedule a follow-up appointment with your primary care doctor or any specialists as instructed.     Discharge Plan:   Diet Plan: Discussed  Activity Level: Discussed  Confirmed Follow up Appointment: Appointment Scheduled  Confirmed Symptoms Management: Discussed  Medication Reconciliation Updated: Yes  Influenza Vaccine Indication: Patient Refuses    I understand that a diet low in cholesterol, fat, and sodium is recommended for good health. Unless I have been given specific instructions below for another diet, I accept this instruction as my diet prescription.   Other diet: heart healthy     Special Instructions: None    · Is patient discharged on Warfarin / Coumadin?   No     Depression / Suicide Risk    As you are discharged from this [facility] facility, it is important to learn how to keep safe from harming yourself.    Recognize the warning signs:  · Abrupt changes in personality, positive or negative- including increase in energy   · Giving away possessions  · Change in eating patterns- significant weight changes-  positive or negative  · Change in sleeping patterns- unable to sleep or sleeping all the time   · Unwillingness or inability to communicate  · Depression  · Unusual sadness, discouragement and loneliness  · Talk of wanting to die  · Neglect of personal appearance   · Rebelliousness- reckless behavior  · Withdrawal from people/activities they love  · Confusion- inability to concentrate     If you or a loved one observes any of these behaviors or has concerns about self-harm, here's what you can do:  · Talk about it- your feelings and reasons for harming yourself  · Remove any means that you might use to hurt yourself (examples: pills, rope, extension cords, firearm)  · Get professional help from the community (Mental Health,  Substance Abuse, psychological counseling)  · Do not be alone:Call your Safe Contact- someone whom you trust who will be there for you.  · Call your local CRISIS HOTLINE 447-5438 or 083-459-5114  · Call your local Children's Mobile Crisis Response Team [place] (137) 644-7780 or www.[employer]  · Call the toll free National Suicide Prevention Hotlines   · National Suicide Prevention Lifeline 029-942-KDOL (5418)  · "[employer] Hope Line Network 800-SUICIDE (797-7874)        Paresthesia  Paresthesia is an abnormal burning or prickling sensation. It is usually felt in the hands, arms, legs, or feet. However, it may occur in any part of the body. Usually, paresthesia is not painful. It may feel like:  · Tingling or numbness.  · Buzzing.  · Itching.  Paresthesia may occur without any clear cause, or it may be caused by:  · Breathing too quickly (hyperventilation).  · Pressure on a nerve.  · An underlying medical condition.  · Side effects of a medication.  · Nutritional deficiencies.  · Exposure to toxic chemicals.  Most people experience temporary (transient) paresthesia at some time in their lives. For some people, it may be long-lasting (chronic) because of an underlying medical condition. If you have paresthesia that lasts a long time, you may need to be evaluated by your health care provider.  Follow these instructions at home:  Alcohol use    · Do not drink alcohol if:  ? Your health care provider tells you not to drink.  ? You are pregnant, may be pregnant, or are planning to become pregnant.  · If you drink alcohol:  ? Limit how much you use to:  § 0-1 drink a day for women.  § 0-2 drinks a day for men.  ? Be aware of how much alcohol is in your drink. In the [place] one drink equals one 12 oz bottle of beer (355 mL), one 5 oz glass of wine (148 mL), or one 1½ oz glass of hard liquor (44 mL).  Nutrition    · Eat a healthy diet. This includes:  ? Eating foods that are high in fiber, such as fresh fruits and  vegetables, whole grains, and beans.  ? Limiting foods that are high in fat and processed sugars, such as fried or sweet foods.  General instructions  · Take over-the-counter and prescription medicines only as told by your health care provider.  · Do not use any products that contain nicotine or tobacco, such as cigarettes and e-cigarettes. These can keep blood from reaching damaged nerves. If you need help quitting, ask your health care provider.  · If you have diabetes, work closely with your health care provider to keep your blood sugar under control.  · If you have numbness in your feet:  ? Check every day for signs of injury or infection. Watch for redness, warmth, and swelling.  ? Wear padded socks and comfortable shoes. These help protect your feet.  · Keep all follow-up visits as told by your health care provider. This is important.  Contact a health care provider if you:  · Have paresthesia that gets worse or does not go away.  · Have a burning or prickling feeling that gets worse when you walk.  · Have pain, cramps, or dizziness.  · Develop a rash.  Get help right away if you:  · Feel weak.  · Have trouble walking or moving.  · Have problems with speech, understanding, or vision.  · Feel confused.  · Cannot control your bladder or bowel movements.  · Have numbness after an injury.  · Develop new weakness in an arm or leg.  · Faint.  Summary  · Paresthesia is an abnormal burning or prickling sensation that is usually felt in the hands, arms, legs, or feet. It may also occur in other parts of the body.  · Paresthesia may occur without any clear cause, or it may be caused by breathing too quickly (hyperventilation), pressure on a nerve, an underlying medical condition, side effects of a medication, nutritional deficiencies, or exposure to toxic chemicals.  · If you have paresthesia that lasts a long time, you may need to be evaluated by your health care provider.  This information is not intended to replace  advice given to you by your health care provider. Make sure you discuss any questions you have with your health care provider.  Document Released: 12/08/2003 Document Revised: 01/13/2020 Document Reviewed: 12/27/2018  Elsevier Patient Education © 2020 [employer]

## 2021-10-22 NOTE — ED PROVIDER NOTES
"ED Provider Note    Scribed for Santosh Griffith M.D. by Parag Cummins. 10/21/2021, 6:27 PM.    Primary care provider: Pcp Pt States None  Means of arrival: Walk-in  History obtained from: Patient  History limited by: None    CHIEF COMPLAINT  Chief Complaint   Patient presents with   • Tingling     Pt states left arm tingling when she woke up today. pt reports to have gone to bed normal, denies injury. CMS intact but states it feels llike her arm is asleep.    • Chest Pressure     very mild, middle of chest     HPI  Tavia Dong is a 31 y.o. female who presents to the Emergency Department for waxing and waning left arm tingling onset this morning. She states that she woke up this morning with her arm feeling \"like it is asleep\" after going to bed normally last night. She reports associated left arm weakness, leg tingling, mild chest tightness, and mild shortness of breath. She states that her leg tingling was exacerbated by sitting down. No alleviating factors were identified. She denies any fever, cough, or leg weakness. She denies any recent injuries. She denies any recent history of smoking, drugs, or alcohol. She denies pregnancy.    REVIEW OF SYSTEMS  Review of Systems   Constitutional: Negative for fever.   Respiratory: Positive for shortness of breath (  Mild). Negative for cough.    Cardiovascular: Positive for chest pain (  Mild).   Musculoskeletal:        Positive for weakness in left arm.     Neurological: Positive for tingling (  Left arm, mildly in bilateral legs  ).   All other systems reviewed and are negative.    PAST MEDICAL HISTORY   has a past medical history of GERD (gastroesophageal reflux disease) and Headache(784.0).    SURGICAL HISTORY   has a past surgical history that includes cholecystectomy (2013).    SOCIAL HISTORY  Social History     Tobacco Use   • Smoking status: Never Smoker   Substance Use Topics   • Alcohol use: No   • Drug use: No      Social History     Substance and Sexual " Activity   Drug Use No     FAMILY HISTORY  Family History   Problem Relation Age of Onset   • Arthritis Mother    • Alcohol/Drug Father         Drug & ETOH abuse   • Hypertension Paternal Grandmother    • Stroke Maternal Grandmother         X 1   • Heart Disease Maternal Grandmother         MI   • Hypertension Maternal Grandmother        CURRENT MEDICATIONS  Home Medications    **Home medications have not yet been reviewed for this encounter**       ALLERGIES  No Known Allergies    PHYSICAL EXAM  VITAL SIGNS: /86   Pulse (!) 114   Temp 36.1 °C (96.9 °F) (Temporal)   Resp 16   Wt 80 kg (176 lb 5.9 oz)   SpO2 99%   BMI 34.44 kg/m²   Vitals reviewed.  Constitutional: Well developed, Well nourished, No acute distress, Non-toxic appearance.   HENT: Normocephalic, Atraumatic, Bilateral external ears normal, Oropharynx moist, No oral exudates, Nose normal.   Eyes: PERRL, EOMI, Conjunctiva normal, No discharge.   Neck: Normal range of motion, No tenderness, Supple, No stridor.   Cardiovascular: Normal heart rate, Normal rhythm, No murmurs, No rubs, No gallops.   Thorax & Lungs: Normal breath sounds, No respiratory distress, No wheezing, No chest tenderness.   Abdomen: Bowel sounds normal, Soft, No tenderness  Skin: Warm, Dry, No erythema, No rash.   Back: No tenderness, No CVA tenderness.   Musculoskeletal: Good range of motion in all major joints.  No asymmetric edema good pulses  Neurologic: Alert, normal finger-to-nose and heel-to-shin bilaterally.  No pronator drift.  No focal weakness at this time.  Cranial nerves II through XII are intact., No focal deficits noted.  NIH score is 0 at this time.  Psychiatric: Affect normal    LABS  Results for orders placed or performed during the hospital encounter of 10/21/21   CBC with Differential   Result Value Ref Range    WBC 8.8 4.8 - 10.8 K/uL    RBC 4.98 4.20 - 5.40 M/uL    Hemoglobin 14.6 12.0 - 16.0 g/dL    Hematocrit 43.8 37.0 - 47.0 %    MCV 88.0 81.4 - 97.8  fL    MCH 29.3 27.0 - 33.0 pg    MCHC 33.3 (L) 33.6 - 35.0 g/dL    RDW 42.0 35.9 - 50.0 fL    Platelet Count 349 164 - 446 K/uL    MPV 9.0 9.0 - 12.9 fL    Neutrophils-Polys 72.80 (H) 44.00 - 72.00 %    Lymphocytes 22.40 22.00 - 41.00 %    Monocytes 2.90 0.00 - 13.40 %    Eosinophils 1.00 0.00 - 6.90 %    Basophils 0.30 0.00 - 1.80 %    Immature Granulocytes 0.60 0.00 - 0.90 %    Nucleated RBC 0.00 /100 WBC    Neutrophils (Absolute) 6.42 2.00 - 7.15 K/uL    Lymphs (Absolute) 1.98 1.00 - 4.80 K/uL    Monos (Absolute) 0.26 0.00 - 0.85 K/uL    Eos (Absolute) 0.09 0.00 - 0.51 K/uL    Baso (Absolute) 0.03 0.00 - 0.12 K/uL    Immature Granulocytes (abs) 0.05 0.00 - 0.11 K/uL    NRBC (Absolute) 0.00 K/uL   Complete Metabolic Panel (CMP)   Result Value Ref Range    Sodium 141 135 - 145 mmol/L    Potassium 2.8 (L) 3.6 - 5.5 mmol/L    Chloride 105 96 - 112 mmol/L    Co2 25 20 - 33 mmol/L    Anion Gap 11.0 7.0 - 16.0    Glucose 105 (H) 65 - 99 mg/dL    Bun 7 (L) 8 - 22 mg/dL    Creatinine 0.73 0.50 - 1.40 mg/dL    Calcium 9.5 8.5 - 10.5 mg/dL    AST(SGOT) 12 12 - 45 U/L    ALT(SGPT) 16 2 - 50 U/L    Alkaline Phosphatase 97 30 - 99 U/L    Total Bilirubin 0.6 0.1 - 1.5 mg/dL    Albumin 5.0 (H) 3.2 - 4.9 g/dL    Total Protein 7.8 6.0 - 8.2 g/dL    Globulin 2.8 1.9 - 3.5 g/dL    A-G Ratio 1.8 g/dL   Troponin   Result Value Ref Range    Troponin T <6 6 - 19 ng/L   ESTIMATED GFR   Result Value Ref Range    GFR If African American >60 >60 mL/min/1.73 m 2    GFR If Non African American >60 >60 mL/min/1.73 m 2   TROPONIN   Result Value Ref Range    Troponin T <6 6 - 19 ng/L   MAGNESIUM   Result Value Ref Range    Magnesium 2.1 1.5 - 2.5 mg/dL   HCG QUAL SERUM   Result Value Ref Range    Beta-Hcg Qualitative Serum Negative Negative   EKG (NOW)   Result Value Ref Range    Report       [facility] Emergency Dept.    Test Date:  2021-10-21  Pt Name:    FLOR ARRIAZA                  Department: ER  MRN:        [id number]                       Room:  Gender:     Female                       Technician: [id number]  :        1990                   Requested By:ER TRIAGE PROTOCOL  Order #:    [id number]                    Reading MD: LIMA BLAKE. AMD    Measurements  Intervals                                Axis  Rate:       92                           P:          28  KY:         160                          QRS:        19  QRSD:       88                           T:          50  QT:         368  QTc:        456    Interpretive Statements  SINUS RHYTHM  BORDERLINE T ABNORMALITIES, ANTERIOR LEADS  No previous ECG available for comparison  Electronically Signed On 10- 20:54:22 PDT by LIMA BLAKE. AMD     EKG (NOW)   Result Value Ref Range    Report       [facility] Emergency Dept.    Test Date:  2021-10-21  Pt Name:    FLOR ARRIAZA                  Department: ER  MRN:        [id number]                      Room:       [facility]  Gender:     Female                       Technician: CS  :        1990                   Requested By:LIMA BLAKE  Order #:    [id number]                    Reading MD: LIMA BLAKE. AMD    Measurements  Intervals                                Axis  Rate:       83                           P:          21  KY:         178                          QRS:        -3  QRSD:       84                           T:          33  QT:         369  QTc:        434    Interpretive Statements  Sinus rhythm  Borderline T abnormalities, anterior leads  Compared to ECG 10/21/2021 15:48:09  No significant changes  Electronically Signed On 10- 20:54:25 PDT by LIMA BLAKE. AMD     All labs reviewed by me.    EKG Interpretation  Interpreted by me as above.    RADIOLOGY  CT-HEAD W/O   Final Result      No evidence of acute intracranial process.      DX-CHEST-PORTABLE (1 VIEW)   Final Result      No evidence of acute cardiopulmonary process.      MR-BRAIN-W/O    (Results Pending)     The  radiologist's interpretation of all radiological studies have been reviewed by me.    COURSE & MEDICAL DECISION MAKING  Pertinent Labs & Imaging studies reviewed. (See chart for details)    .    6:27 PM Patient seen and examined at bedside. The patient presents with left arm numbness, weakness lack of coronation, and chest pain., and the differential diagnosis includes but is not limited to CVA vs TIA vs electrolyte abnormality vs ACS vs musculoskeletal discomfort. Ordered for EKG, Troponin, HCG qual, CMP, CBC w/ diff, Magnesium, CT-head w/o, and DX-chest to evaluate. Patient will be treated with Kdur 40mEq and KCL 10mEq x 2 for her symptoms.      The patient is found to have hypokalemia.  This could contribute to her symptoms I ordered replacement of this.  Added a magnesium.  The patient does have an abnormal EKG with anterior ST segment depression T wave inversions.  This is concerning for ACS.  She has a heart score of 0 and 2 - troponins values unlikely to be cardiac.  A broad additional diagnosis was considered for chest pain including but not limited to ACS, this is as above.  She is PERC negative without difficulty.  Chest x-ray does not show pneumonia or pneumothorax.  Clinical history is not suggestive of a dissection.    Patient also left-sided weakness this is concerning for multiple etiology including CVA or TIA.  She would not be a candidate for alteplase because symptoms happened several hours ago and she is out of the window for alteplase.  CT is not performed because she is not a candidate for thrombectomy because of NIH score of 0.  This would not benefit her.  The patient did have symptoms that sound like a TIA.  Her chest pain was not severe to suggest associated dissection.  I do think the patient requires further work-up for this.  sHe has not improved with replacement of her potassium.    8:55 PM Paged hospitalist.    9:14 PM I reevaluated the patient at bedside. I informed the patient of my  plan to admit today given the patient's current presentation and diagnostic study results. Patient verbalizes understanding and support with my plan for admission.     DISPOSITION:  Patient will be hospitalized by Dr. Byrd in fair condition.    FINAL IMPRESSION  1. Weakness of left upper extremity    2. Pleuritic chest pain    3. EKG abnormalities    4. Hypokalemia          Parag HUITRON (Scribe), am scribing for, and in the presence of, Santosh Griffith M.D..    Electronically signed by: Parag Cummins (Scribe), 10/21/2021    Santosh HUITRON M.D. personally performed the services described in this documentation, as scribed by Parag Cummins in my presence, and it is both accurate and complete. C.    The note accurately reflects work and decisions made by me.  Santosh Griffith M.D.  10/21/2021  11:09 PM

## 2021-10-22 NOTE — H&P
Hospital Medicine History & Physical Note    Date of Service    Primary Care Physician  Pcp Pt States None    Consultants  None    Code Status  Full Code    Chief Complaint  Chief Complaint   Patient presents with   • Tingling     Pt states left arm tingling when she woke up today. pt reports to have gone to bed normal, denies injury. CMS intact but states it feels llike her arm is asleep.    • Chest Pressure     very mild, middle of chest       History of Presenting Illness  31F Latin descent with family history of early-age strokes in her maternal mother and personal history of alopecia areata presented to ED with 1 day of left arm tingling, sensation of heaviness and chest tightness with some shortness of breath. Patient has had recent stressor of her aunt dying 2 weeks prior. Patient states she has not had diaphoresis, chills, body aches, recent illnesses or ill contacts, N/V, diarrhea, constipation, recent tobacco, drug, or alcohol use. CT Head done in ED was negative, troponin negative x2 with some mild ekg changes unremarkable cbc/cmp except for slight left shift and hypokalemia which was supplemented in the ED. Pregnancy test was negative. I will admit patient for CVA/TIA rule out, obtain CTA neck/head and MRI studies. Due to the ekg changes, I will obtain D-dimer and consider echo.    I discussed the plan of care with patient.    Review of Systems  ROS  All systems reviewed and negative except as noted in HPI.    Past Medical History   has a past medical history of GERD (gastroesophageal reflux disease) and Headache(784.0).    Surgical History   has a past surgical history that includes cholecystectomy (2013).     Family History  family history includes Alcohol/Drug in her father; Arthritis in her mother; Heart Disease in her maternal grandmother; Hypertension in her maternal grandmother and paternal grandmother; Stroke in her maternal grandmother.   Family history reviewed with patient. There is family  history that is pertinent to the chief complaint.     Social History   reports that she has never smoked. She does not have any smokeless tobacco history on file. She reports that she does not drink alcohol and does not use drugs.    Allergies  No Known Allergies    Medications  None       Physical Exam  Temp:  [36.1 °C (96.9 °F)-36.7 °C (98 °F)] 36.7 °C (98 °F)  Pulse:  [] 96  Resp:  [13-22] 22  BP: (112-150)/(69-86) 122/79  SpO2:  [96 %-99 %] 96 %  Blood Pressure: 112/69   Temperature: 36.1 °C (96.9 °F)   Pulse: 92   Respiration: 16   Pulse Oximetry: 99 %       Physical Exam    Constitutional: Resting comfortably in NAD   HENT: Normocephalic, no obvious evidence of acute trauma.  Eyes: No scleral icterus. Normal conjunctiva   Neck: Comfortable movement without any obvious restriction in the range of motion.  Cardiovascular: Upon ascultation I appreciate a regular heart rhythm and a normal rate with no murmurs, rubs or gallops  Thorax & Lungs: No respiratory distress. No wheezing, rales or rhonchi heard on ausculation.  there is no obvious chest wall tenderness. I appreciate normal air movement throughout.   Abdomen: The abdomen is not visibly distended. Upon palpation, I find it to be without tenderness.  No mass appreciated.  Skin: The exposed portions of skin reveal no obvious rash or other abnormalities except for a piercing adjacent to oral labia  Extremities/Musculoskeletal: no lower extremity edema with no asymmetry.  Neurologic: Very slight left facialasymetry when smiling, may be normal variation, cn 2-12 otherwise intact and NIH score otherwise 0, her left upper extremity strength and range of motion is intact, sensation grossly intact.   Psychiatric: Normal affect appropriate for the clinical situation.      Laboratory:  Recent Labs     10/21/21  1619   WBC 8.8   RBC 4.98   HEMOGLOBIN 14.6   HEMATOCRIT 43.8   MCV 88.0   MCH 29.3   MCHC 33.3*   RDW 42.0   PLATELETCT 349   MPV 9.0     Recent Labs      10/21/21  1619   SODIUM 141   POTASSIUM 2.8*   CHLORIDE 105   CO2 25   GLUCOSE 105*   BUN 7*   CREATININE 0.73   CALCIUM 9.5     Recent Labs     10/21/21  1619   ALTSGPT 16   ASTSGOT 12   ALKPHOSPHAT 97   TBILIRUBIN 0.6   GLUCOSE 105*     Recent Labs     10/21/21  1619   INR 1.07     No results for input(s): NTPROBNP in the last 72 hours.      Recent Labs     10/21/21  1619 10/21/21  1844   TROPONINT <6 <6       Imaging:  CT-HEAD W/O   Final Result      No evidence of acute intracranial process.      DX-CHEST-PORTABLE (1 VIEW)   Final Result      No evidence of acute cardiopulmonary process.      MR-BRAIN-W/O    (Results Pending)   EC-ECHOCARDIOGRAM COMPLETE W/O CONT    (Results Pending)   US-CAROTID DOPPLER BILAT    (Results Pending)   CT-CTA NECK WITH & W/O-POST PROCESSING    (Results Pending)   CT-CTA HEAD WITH & W/O-POST PROCESS    (Results Pending)       X-Ray:  I have personally reviewed the images and compared with prior images.  EKG:  I have personally reviewed the images and compared with prior images.    Assessment/Plan:  I anticipate this patient is appropriate for observation status at this time.    TIA (transient ischemic attack)- (present on admission)  Assessment & Plan  - Aspirin 325mg x1 -> 81mg qd  - Initiate Atorvastatin 40mg qd or Rosuvastatin 20mg qd  - Neurochecks q4h  - F/u with Lipid panel and A1c  - CT head negative for acute pathology  - CTA Head/Neck  - f/u with MRI Brain and TTE  - Telemetry monitoring  - Rec Neurology consult        VTE prophylaxis: SCDs/TEDs and heparin ppx

## 2021-10-22 NOTE — CARE PLAN
The patient is Stable - Low risk of patient condition declining or worsening    Shift Goals  Clinical Goals: Echo completed   Patient Goals: DC    Progress made toward(s) clinical / shift goals:    Problem: Psychosocial - Patient Condition  Goal: Patient's ability to verbalize feelings about condition will improve  Outcome: Progressing     Problem: Optimal Care of the Stroke Patient  Goal: Optimal emergency care for the stroke patient  Outcome: Progressing       Patient is not progressing towards the following goals:

## 2021-10-22 NOTE — ED NOTES
Med Rec completed: per pt at bedside. Pt reports no current home medications.     No ORAL antibiotics in last 30 days    [facility]     Pt confirmed following allergies:  No Known Allergies     Pt's home medications:     Pt reports no current home medications     Removed medications:   Medication Removal Reason   • [DISCONTINUED] cyclobenzaprine (FLEXERIL) 10 MG Tab   [DISCONTINUED] oxycodone-acetaminophen (PERCOCET) 5-325 MG Tab   [DISCONTINUED] naproxen (NAPROSYN) 500 MG Tab    Pt reports not taking

## 2021-10-22 NOTE — THERAPY
Physical Therapy   Initial Evaluation     Patient Name: Tavia Dogn  Age:  31 y.o., Sex:  female  Medical Record #: [id number]  Today's Date: 10/22/2021          Assessment  Patient is 31 y.o. female with a diagnosis of TIA.  Additional factors influencing patient status / progress : pt moves well, supervised for OOB, transfers and ambulation with no balance or strength issues observed. Pt did need a reminder to use the L hand when donning shoes as she avoiding using it at first. Pt was then able to use both hands to don shoes..      Plan    Recommend Physical Therapy for Evaluation only   DC Equipment Recommendations: None  Discharge Recommendations: Anticipate that the patient will have no further physical therapy needs after discharge from the hospital       Subjective    Pt reports stress with work, school and single mom of 3 kids under age 13.     Objective       10/22/21 0922   Prior Living Situation   Prior Services None   Housing / Facility 1 Story House   Steps Into Home 0   Steps In Home 0   Equipment Owned None   Lives with - Patient's Self Care Capacity Child Less than 18 Years of Age  (kids 12, 6, 8.  Pt's father lives next door, helps)   Prior Level of Functional Mobility   Bed Mobility Independent   Transfer Status Independent   Ambulation Independent   Distance Ambulation (Feet)   (community, works, school, stress. )   Assistive Devices Used None   Stairs Independent   Gait Analysis   Gait Level Of Assist Supervised   Assistive Device None   Distance (Feet) 200   Bed Mobility    Supine to Sit Supervised   Sit to Supine Supervised   Scooting Supervised   Rolling Supervised   Functional Mobility   Sit to Stand Supervised   Bed, Chair, Wheelchair Transfer Supervised   Comments when pt goes to don shoes, she does not use the L hand. Pt was educated in need to use L hand as much as possible despite c/o tingling. Pt was then able to use both hands to slip shoes on.    Education Group   Education  Provided Role of Physical Therapist   Role of Physical Therapist Patient Response Patient;Acceptance;Explanation;Verbal Demonstration   Anticipated Discharge Equipment and Recommendations   DC Equipment Recommendations None   Discharge Recommendations Anticipate that the patient will have no further physical therapy needs after discharge from the hospital

## 2021-10-22 NOTE — PROGRESS NOTES
To whom it may concern Tavia Coronel was under our medical care from  10/21 to 10/22/2021 at [facility].  Please excuse her absence from work.      She may return to work on Tuesday October 26 2021 no restrictions    Questions??    [phone]              Griffin Chavez MD.

## 2021-10-22 NOTE — THERAPY
Occupational Therapy   Initial Evaluation     Patient Name: Tavia Dong  Age:  31 y.o., Sex:  female  Medical Record #: [id number]  Today's Date: 10/22/2021          Assessment  Patient is 31 y.o. female with a diagnosis of L UE numbness.  Pt is at or near his/her functional baseline. Pt with no further skilled OT needs in the acute care setting at this time.      Plan     Occupational Therapy for Evaluation only       Discharge Recommendations: (P) Anticipate that the patient will have no further occupational therapy needs after discharge from the hospital        10/22/21 0718   Prior Living Situation   Prior Services None   Housing / Facility 2 Story Apartment / Condo  (town house)   Equipment Owned None   Lives with - Patient's Self Care Capacity Child Less than 18 Years of Age  (3 kids)   Prior Level of ADL Function   Self Feeding Independent   Grooming / Hygiene Independent   Bathing Independent   Dressing Independent   Toileting Independent   ADL Assessment   Grooming Supervision   Upper Body Dressing Supervision   Lower Body Dressing Supervision   Toileting Supervision   Functional Mobility   Sit to Stand Supervised   Bed, Chair, Wheelchair Transfer Supervised

## 2021-10-22 NOTE — PROGRESS NOTES
Covid-19 surge in effect.    Patient arrived to unit with transport and was able to safely ambulate self on to bed. Patient is AOx4, denies any sob,n/v or pain, but report numbness and tingling to left arm.Sinus rhythm on tele-monitor. Patient updated on plan of care, all need met at this time. Bed locked and lowest position. Call light and personal belongings within reach.

## 2021-10-22 NOTE — ED NOTES
Called lab and confirmed add on labs could be run, was informed the samples in lab will be used now

## 2021-10-22 NOTE — DISCHARGE SUMMARY
Discharge Summary    CHIEF COMPLAINT ON ADMISSION  Chief Complaint   Patient presents with   • Tingling     Pt states left arm tingling when she woke up today. pt reports to have gone to bed normal, denies injury. CMS intact but states it feels llike her arm is asleep.    • Chest Pressure     very mild, middle of chest       Reason for Admission  L arm tingling, chest tightness     Admission Date  10/21/2021    CODE STATUS  Prior    HPI & HOSPITAL COURSE  As per dr chance h+p    31F Latin descent with family history of early-age strokes in her maternal mother and personal history of alopecia areata presented to ED with 1 day of left arm tingling, sensation of heaviness and chest tightness with some shortness of breath. Patient has had recent stressor of her aunt dying 2 weeks prior. Patient states she has not had diaphoresis, chills, body aches, recent illnesses or ill contacts, N/V, diarrhea, constipation, recent tobacco, drug, or alcohol use. CT Head done in ED was negative, troponin negative x2 with some mild ekg changes unremarkable cbc/cmp except for slight left shift and hypokalemia which was supplemented in the ED. Pregnancy test was negative. I will admit patient for CVA/TIA rule out, obtain CTA neck/head and MRI studies. Due to the ekg changes, I will obtain D-dimer and consider echo.    During her hospital stay today she had no evidence of arrhythmia.  Her troponins were negative.  MRI of the brain negative for acute CVA.  Echocardiogram did show evidence of patent foramen.  Patient referred to outpatient cardiology for follow-up.        Therefore, she is discharged in good and stable condition to home with close outpatient follow-up.    The patient recovered much more quickly than anticipated on admission.    Discharge Date  10/22/2021    FOLLOW UP ITEMS POST DISCHARGE      DISCHARGE DIAGNOSES  Active Problems:    Right to left cardiac shunt (HCC) POA: Yes  Resolved Problems:    Arm numbness left POA:  Yes      FOLLOW UP  No future appointments.  [facility]  [address],   [place] [address]  [phone]  Call  Please call [facility] to establish with a cardiologist. Thank you.     Pcp Pt States None            MEDICATIONS ON DISCHARGE     Medication List      START taking these medications      Instructions   aspirin EC 81 MG Tbec  Commonly known as: ECOTRIN   Take 1 Tablet by mouth every day.  Dose: 81 mg            Allergies  No Known Allergies    DIET  No orders of the defined types were placed in this encounter.      ACTIVITY  As tolerated.  Weight bearing as tolerated    CONSULTATIONS  none    PROCEDURES  [address] [address] Tavia Coronel  MRN: [id number], : 1990, Sex: F  Visit date: 10/22/2021   Protocol Summary  Protocol History  Protocol not completed.    Images     Show images for MR-BRAIN-W/O  MR-BRAIN-W/O  Order: [id number]  Status:  Final result   Visible to patient:  Yes (not seen) Next appt:  None   0 Result Notes  Details    Reading Physician Reading Date Result Priority   Dimitris Segovia M.D.  [phone] 10/22/2021 Routine   Narrative & Impression     10/22/2021 1:28 AM     HISTORY/REASON FOR EXAM:  Transient ischemic attack (TIA).        TECHNIQUE/EXAM DESCRIPTION:  MRI of the brain without contrast.     T1 sagittal, T2 fast spin-echo axial, T1 coronal, FLAIR coronal, diffusion-weighted and apparent diffusion coefficient (ADC map) axial images were obtained of the whole brain.     The study was performed on a [employer] 1.5 Lanie MRI scanner.     COMPARISON:  None.     FINDINGS: There is no acute infarct. There is no acute or chronic parenchymal hemorrhage. There is no intra-axial space-occupying lesion. There is no extra-axial fluid collection, hemorrhage or mass. The ventricles, cortical sulci and the basal cisterns   are unremarkable.  The visualized flow voids of the cerebral arteries are unremarkable. The flow voids of the venous sinuses are unremarkable.  There is no large lesion identified in the expected course of the intracranial portions of the cranial nerves.  The skull bones are unremarkable. The paranasal sinuses are clear. The extracranial soft tissue including orbits appear grossly normal.        IMPRESSION:     1.  No acute abnormality.  2.  Unremarkable noncontrast MR examination of the brain.        ==================================  EC-ECHOCARDIOGRAM COMPLETE W/O CONT  Order: [id number]  Status:  Final result   Visible to patient:  Yes (not seen) Next appt:  None   0 Result Notes  Details    Reading Physician Reading Date Result Priority   No Reading Provider Prelim 10/22/2021    Senthil Lester M.D.  [phone] 10/22/2021    Narrative & Impression  Transthoracic  Echo Report        Echocardiography Laboratory     CONCLUSIONS  No prior study is available for comparison.   Normal left ventricular systolic function.  The left ventricular ejection fraction is visually estimated to be 65%.  No significant valvular abnormalities.   Evidence of early (0-5 beats) right to left shunt suggestive of   intracardiac shunt (ASD or PFO).  A definite cardiac source for a systemic thromboembolic event is not   identified, failure to do so does not exclude its presence. If   clinically indicated, a transesophageal study would be useful.            FLOR ARRIAZA  Exam Date:         10/22/2021                      09:18  Exam Location:     Inpatient  Priority:          Routine     Ordering Physician:        AMARI GUDINO  Referring Physician:       TERESE Chow  Sonographer:               Adalberto Chance RDCS     Age:    31     Gender:    F  MRN:    [id number]  :    1990  BSA:    1.77   Ht (in):    60     Wt (lb):    176  Exam Type:     Complete     Indications:     TIA  ICD Codes:       435.9     CPT Codes:       49387     BP:   121    /    79     HR:   82  Technical Quality:       Good     MEASUREMENTS  (Male / Female) Normal Values  2D ECHO  Estimated LV Ejection Fraction    65 %                       * Indicates values subject to auto-interpretation  LV EF:  65    %     FINDINGS  Left Ventricle  Normal left ventricular chamber size. Normal left ventricular wall   thickness. Normal left ventricular systolic function. The left   ventricular ejection fraction is visually estimated to be 65%. Normal   regional wall motion. Normal diastolic function.     Right Ventricle  Normal right ventricular size and systolic function.     Right Atrium  Normal right atrial size. Normal inferior vena cava size and   inspiratory collapse.     Left Atrium  Normal left atrial size. Left atrial volume index is 16 mL/sq m.   Evidence of early (0-5 beats) right to left shunt suggestive of   intracardiac shunt (ASD or PFO).     Mitral Valve  Structurally normal mitral valve without significant stenosis or   regurgitation.     Aortic Valve  Structurally normal aortic valve without significant stenosis or   regurgitation.     Tricuspid Valve  Structurally normal tricuspid valve without significant stenosis or   regurgitation.     Pulmonic Valve  Structurally normal pulmonic valve. No pulmonic stenosis. Mild pulmonic   insufficiency.     Pericardium  Normal pericardium without effusion.     Aorta  Normal aortic root for body surface area. The ascending aorta diameter   is 2.8 cm.                                Senthil Lester M.D.  (Electronically Signed)              LABORATORY  Lab Results   Component Value Date    SODIUM 140 10/22/2021    POTASSIUM 3.6 10/22/2021    CHLORIDE 108 10/22/2021    CO2 22 10/22/2021    GLUCOSE 93 10/22/2021    BUN 6 (L) 10/22/2021    CREATININE 0.73 10/22/2021    CREATININE 0.7 10/25/2008        Lab Results   Component Value Date    WBC 7.9 10/22/2021    HEMOGLOBIN 13.3 10/22/2021    HEMATOCRIT 40.1 10/22/2021    PLATELETCT 295 10/22/2021         Total time of the discharge process exceeds 37  minutes.

## 2021-10-22 NOTE — ASSESSMENT & PLAN NOTE
- Aspirin 325mg x1 -> 81mg qd  - Initiate Atorvastatin 40mg qd or Rosuvastatin 20mg qd  - Neurochecks q4h  - F/u with Lipid panel and A1c  - CT head negative for acute pathology  - CTA Head/Neck  - f/u with MRI Brain and TTE  - Telemetry monitoring  - Rec Neurology consult

## 2021-10-23 PROBLEM — R20.0 ARM NUMBNESS LEFT: Status: RESOLVED | Noted: 2021-10-22 | Resolved: 2021-10-22

## 2021-10-23 PROBLEM — R29.898 ARM WEAKNESS: Status: RESOLVED | Noted: 2021-10-22 | Resolved: 2021-10-22

## 2021-11-30 ENCOUNTER — OFFICE VISIT (OUTPATIENT)
Dept: CARDIOLOGY | Facility: MEDICAL CENTER | Age: 31
End: 2021-11-30
Payer: MEDICAID

## 2021-11-30 ENCOUNTER — TELEPHONE (OUTPATIENT)
Dept: CARDIOLOGY | Facility: MEDICAL CENTER | Age: 31
End: 2021-11-30

## 2021-11-30 VITALS
BODY MASS INDEX: 35.89 KG/M2 | HEART RATE: 90 BPM | RESPIRATION RATE: 14 BRPM | OXYGEN SATURATION: 100 % | DIASTOLIC BLOOD PRESSURE: 78 MMHG | WEIGHT: 182.8 LBS | HEIGHT: 60 IN | SYSTOLIC BLOOD PRESSURE: 116 MMHG

## 2021-11-30 DIAGNOSIS — R20.0 LEFT ARM NUMBNESS: ICD-10-CM

## 2021-11-30 PROCEDURE — 99203 OFFICE O/P NEW LOW 30 MIN: CPT | Performed by: INTERNAL MEDICINE

## 2021-11-30 ASSESSMENT — FIBROSIS 4 INDEX: FIB4 SCORE: 0.29

## 2021-11-30 NOTE — PROGRESS NOTES
CARDIOLOGY CONSULTATION NOTE      Date of Consultation: 11/30/2021    Primary Care Provider: ARSLAN Montero  Referring Provider: Griffin Chavez M.D.    Patient Name: Tavia Coronel  YOB: 1990  MRN: [id number]     Reason for Consultation:   PFO     Patient Story:   Tavia Dong is a 31 year-old no remarkable past cardiac history.  Briefly, on 10/21/2021 she presented to the emergency department with acute onset of left shoulder/arm numbness.  She underwent mostly unremarkable evaluation with undetectable troponins and no evidence of acute infarct on MRI.  She was noted to have a scant number of bubbles shunting from left to right on her echocardiogram and referred to cardiology for further evaluation.    She presents today for consultation.  She states that her left arm symptoms have now essentially resolved.  On the day of her admission, she began to have significant left shoulder/arm heaviness and numbness similar to a dead leg sensation.  When this had not resolved by around noon she went to the ED.  She states that her symptoms persisted until the following morning and then only slowly resolved over the next 2 days.  She did follow-up with [facility] after her admission and is scheduled for an EMG this week.     Medications and Allergies:     Current Outpatient Medications   Medication Sig Dispense Refill   • aspirin EC (ECOTRIN) 81 MG Tablet Delayed Response Take 1 Tablet by mouth every day. 30 Tablet 3     No current facility-administered medications for this visit.     No Known Allergies     Medical Decision Making:   We had a long discussion regarding the findings of a likely PFO on her echocardiogram.  I did reassure her that this is a very common finding, which we see incidentally in about 20-25% of echocardiograms, and that PFOs are only very rarely associated with TIA/CVAs.  I also explained that, as her MRI did not show any evidence of an acute infarct  despite around 24 hours of acute symptoms, that my suspicion that her symptoms were related to a TIA or CVA was low.  In the absence of clear evidence of an infarct on MRI, I would not recommend proceeding with PFO closure unless her neurologist felt this was strongly indicated, as there are potential short-term and long-term risks of device closure including cardiac perforation and device embolization acutely as well as device infection, device erosion, and arrhythmia long-term.    I will follow up with her neurologist at [facility] to see if they feel she should undergo closure, otherwise she can follow-up as needed if there are new symptoms or concerns.     Cardiac Studies and Procedures:   Echocardiography  TTE (10/22/2021)-reviewed  Normal left ventricular systolic function.  The left ventricular ejection fraction is visually estimated to be 65%.  No significant valvular abnormalities.   Evidence of early (0-5 beats) right to left shunt suggestive of intracardiac shunt (ASD or PFO).     CT/MRI  MRI brain (10/22/2021)-reviewed  1.  No acute abnormality.  2.  Unremarkable noncontrast MR examination of the brain.    CTA neck (10/21/2021)-reviewed  1.  CT angiogram of the neck within normal limits.  2.  Low-density nodule in the right thyroid lobe, recommend follow-up thyroid sonography for further characterization due to nodule size.    Electrophysiology  ECG (10/21/2021)-reviewed  Normal sinus rhythm with borderline T wave abnormalities     Vital Signs:   /78 (BP Location: Left arm, Patient Position: Sitting, BP Cuff Size: Adult)   Pulse 90   Resp 14   Ht 1.524 m (5')   Wt 82.9 kg (182 lb 12.8 oz)   SpO2 100%    BP Readings from Last 4 Encounters:   11/30/21 116/78   10/22/21 117/70   02/07/17 123/85   04/26/16 108/77     Wt Readings from Last 4 Encounters:   11/30/21 82.9 kg (182 lb 12.8 oz)   10/22/21 81 kg (178 lb 9.2 oz)   02/07/17 69.9 kg (154 lb 1.6 oz)   04/26/16 68 kg (150 lb)     Body  mass index is 35.7 kg/m².     Laboratories:   Lipids  Lab Results   Component Value Date/Time    LDL 86 10/22/2021 0425     Lab Results   Component Value Date/Time    HDL 29 (A) 10/22/2021 0425       Lab Results   Component Value Date/Time    TRIGLYCERIDE 84 10/22/2021 0425       Lab Results   Component Value Date/Time    CHOLSTRLTOT 132 10/22/2021 0425       GFR  Lab Results   Component Value Date/Time    IFNOTAFR >60 10/22/2021 0425    IFNOTAFR >60 10/21/2021 1619    IFNOTAFR >60 04/27/2016 0138    IFNOTAFR >60 03/04/2013 0549       Chemistries  Lab Results   Component Value Date/Time    CREATININE 0.73 10/22/2021 0425    CREATININE 0.73 10/21/2021 1619    CREATININE 1.04 04/27/2016 0138    CREATININE 0.81 03/04/2013 0549    CREATININE 0.7 10/25/2008 1535     Lab Results   Component Value Date/Time    BUN 6 (L) 10/22/2021 0425    BUN 7 (L) 10/21/2021 1619    BUN 19 04/27/2016 0138    BUN 6 (L) 03/04/2013 0549    BUN <5 (L) 10/25/2008 1535     Lab Results   Component Value Date/Time    POTASSIUM 3.6 10/22/2021 0425    POTASSIUM 2.8 (L) 10/21/2021 1619    POTASSIUM 3.1 (L) 04/27/2016 0138     Lab Results   Component Value Date/Time    SODIUM 140 10/22/2021 0425    SODIUM 141 10/21/2021 1619    SODIUM 133 (L) 04/27/2016 0138     Lab Results   Component Value Date/Time    GLUCOSE 93 10/22/2021 0425    GLUCOSE 105 (H) 10/21/2021 1619    GLUCOSE 114 (H) 04/27/2016 0138     Lab Results   Component Value Date/Time    ASTSGOT 9 (L) 10/22/2021 0425    ASTSGOT 12 10/21/2021 1619    ASTSGOT 16 04/27/2016 0138     Lab Results   Component Value Date/Time    ALTSGPT 12 10/22/2021 0425    ALTSGPT 16 10/21/2021 1619    ALTSGPT 19 04/27/2016 0138     Lab Results   Component Value Date/Time    ALKPHOSPHAT 84 10/22/2021 0425    ALKPHOSPHAT 97 10/21/2021 1619    ALKPHOSPHAT 123 (H) 04/27/2016 0138     Lab Results   Component Value Date/Time    HBA1C 4.6 10/21/2021 1619     Lab Results   Component Value Date/Time    TROPONINT <6  10/22/2021 0425    TROPONINT <6 10/21/2021 1844    TROPONINT <6 10/21/2021 1619       Blood Counts  Lab Results   Component Value Date/Time    HEMOGLOBIN 13.3 10/22/2021 0425    HEMOGLOBIN 14.6 10/21/2021 1619    HEMOGLOBIN 16.2 (H) 04/27/2016 0138     Lab Results   Component Value Date/Time    PLATELETCT 295 10/22/2021 0425    PLATELETCT 349 10/21/2021 1619    PLATELETCT 271 04/27/2016 0138     Lab Results   Component Value Date/Time    WBC 7.9 10/22/2021 0425    WBC 8.8 10/21/2021 1619    WBC 6.5 04/27/2016 0138        Physical Examination:   General: Well-appearing, no acute distress  Eyes: Extraocular movements intact, anicteric  Ears: No ear lobe crease  Neck: Supple, full range of motion, no gross jugular venous distension  Pulmonary: Normal respiratory effort, no distress  Cardiovascular: Regular rate  Extremities: No gross lower extremity edema  Neurological: Alert and oriented, no gross focal motor deficits     Past History:   Past Medical History  The patient's past medical history was reviewed.  See HPI and self-reported patient medical history form for pertinent medical history to consultation.    Past Social History  The patient's social history was reviewed.  See [facility] self-reported patient medical history form for pertinent social history to consultation.    Past Social History  The patient's family history was reviewed.  See HPI self-reported patient medical history form for pertinent family history to consultation.    Review of Systems  A pertinent cardiac review of systems was performed and was otherwise unremarkable except as per HPI and self-reported patient medical history form.        Suhas Louie MD, [facility]  Interventional Cardiology  [facility]  [facility], [address]  [address] [address]  Phone: [phone]  Fax: [phone]

## 2021-11-30 NOTE — TELEPHONE ENCOUNTER
Message  Received: Today  Suhas Louie M.D.  Elsy Thorne R.N.  Can you have one of the MA fax a copy of my note to [facility].     Thanks   _________________________________________________    Last office visit note faxed to [facility] at [phone]. Receipt confirmed.

## 2023-04-14 ENCOUNTER — HOSPITAL ENCOUNTER (EMERGENCY)
Facility: MEDICAL CENTER | Age: 33
End: 2023-04-14
Attending: EMERGENCY MEDICINE
Payer: MEDICAID

## 2023-04-14 ENCOUNTER — APPOINTMENT (OUTPATIENT)
Dept: RADIOLOGY | Facility: MEDICAL CENTER | Age: 33
End: 2023-04-14
Attending: EMERGENCY MEDICINE
Payer: MEDICAID

## 2023-04-14 VITALS
TEMPERATURE: 99.4 F | WEIGHT: 184.75 LBS | HEIGHT: 60 IN | SYSTOLIC BLOOD PRESSURE: 122 MMHG | RESPIRATION RATE: 18 BRPM | BODY MASS INDEX: 36.27 KG/M2 | DIASTOLIC BLOOD PRESSURE: 81 MMHG | OXYGEN SATURATION: 97 % | HEART RATE: 95 BPM

## 2023-04-14 DIAGNOSIS — R20.0 NUMBNESS: ICD-10-CM

## 2023-04-14 PROCEDURE — 700102 HCHG RX REV CODE 250 W/ 637 OVERRIDE(OP): Performed by: EMERGENCY MEDICINE

## 2023-04-14 PROCEDURE — 99284 EMERGENCY DEPT VISIT MOD MDM: CPT

## 2023-04-14 PROCEDURE — 70450 CT HEAD/BRAIN W/O DYE: CPT

## 2023-04-14 PROCEDURE — A9270 NON-COVERED ITEM OR SERVICE: HCPCS | Performed by: EMERGENCY MEDICINE

## 2023-04-14 PROCEDURE — 700111 HCHG RX REV CODE 636 W/ 250 OVERRIDE (IP): Performed by: EMERGENCY MEDICINE

## 2023-04-14 RX ORDER — ONDANSETRON 4 MG/1
4 TABLET, ORALLY DISINTEGRATING ORAL ONCE
Status: COMPLETED | OUTPATIENT
Start: 2023-04-14 | End: 2023-04-14

## 2023-04-14 RX ORDER — CLONAZEPAM 0.5 MG/1
0.5 TABLET ORAL ONCE
Status: COMPLETED | OUTPATIENT
Start: 2023-04-14 | End: 2023-04-14

## 2023-04-14 RX ADMIN — CLONAZEPAM 0.5 MG: 0.5 TABLET ORAL at 17:35

## 2023-04-14 RX ADMIN — ONDANSETRON 4 MG: 4 TABLET, ORALLY DISINTEGRATING ORAL at 17:35

## 2023-04-14 ASSESSMENT — LIFESTYLE VARIABLES: DO YOU DRINK ALCOHOL: NO

## 2023-04-14 ASSESSMENT — FIBROSIS 4 INDEX: FIB4 SCORE: 0.31

## 2023-04-14 NOTE — ED PROVIDER NOTES
"  ER Provider Note    Scribed for Joaquim Davidson M.d. by Laci Chung. 4/14/2023  3:47 PM    Primary Care Provider: ARSLAN Montero    CHIEF COMPLAINT  Chief Complaint   Patient presents with    Numbness     Pt presents to ED for Lt arm numbness, shakes, and dizziness. Pt states this has happened before. Pt Neg on stroke scale. Noticeable weakness in Lt arm.     EXTERNAL RECORDS REVIEWED  Inpatient Notes Patient was seen in 2021 for left arm numbness and had a negative Neck CTA and MRI. Echo showed a PFO and she followed up with Cardiology who recommended against closure of this.      HPI/ROS  LIMITATION TO HISTORY   Select: : None  OUTSIDE HISTORIAN(S):  None    Tavia Dong is a 33 y.o. female who presents to the ED complaining of left arm numbness onset 2 hours ago while sitting at a desk. Patient endorses associated chills and generalized shaking that onset \"like a wave\" after her numbness onset. She notes her chills and shaking lasted for 10 minutes, but reports her numbness is still present. She reports left hand tingling. She denies any headache or lower extremity numbness. Patient reports history of similar symptoms in 2021 that lasted for a day. She reports history of smaller episodes since the first. She notes her chills and generalized shaking was present, which she notes was absent in her prior episodes. No alleviating or exacerbating factors noted. She follows up with Neurology. Patient is on muscle relaxants.    PAST MEDICAL HISTORY  Past Medical History:   Diagnosis Date    GERD (gastroesophageal reflux disease)     Headache(784.0)        SURGICAL HISTORY  Past Surgical History:   Procedure Laterality Date    CHOLECYSTECTOMY  2013    due to gall stones.       FAMILY HISTORY  Family History   Problem Relation Age of Onset    Arthritis Mother     Alcohol/Drug Father         Drug & ETOH abuse    Hypertension Paternal Grandmother     Stroke Maternal Grandmother         X 1    Heart Disease " Maternal Grandmother         MI    Hypertension Maternal Grandmother        SOCIAL HISTORY   reports that she has never smoked. She has never used smokeless tobacco. She reports that she does not drink alcohol and does not use drugs.    CURRENT MEDICATIONS  Discharge Medication List as of 4/14/2023  6:14 PM        CONTINUE these medications which have NOT CHANGED    Details   aspirin EC (ECOTRIN) 81 MG Tablet Delayed Response Take 1 Tablet by mouth every day., Disp-30 Tablet, R-3, Normal             ALLERGIES  Patient has no known allergies.    PHYSICAL EXAM  /87   Pulse (!) 104   Temp 36.6 °C (97.9 °F) (Temporal)   Resp 18   Ht 1.524 m (5')   Wt 83.8 kg (184 lb 11.9 oz)   SpO2 100%   BMI 36.08 kg/m²   Gen: Alert, no acute distress  HEENT: ATNC  Neck: trachea midline  Resp: no respiratory distress  CV: No JVD  Abd: non-distended  Ext: No deformities  Psych: normal mood  Neuro: Diminished sensation in glove distrubution of L arm up to the shoulders. Cranial nerves 2-12 intact. 2+ radial pulses. Normal cerebellar function. speech fluent      DIAGNOSTIC STUDIES    Radiology:   The attending emergency physician has independently interpreted the diagnostic imaging associated with this visit and am waiting the final reading from the radiologist.   Preliminary interpretation is a follows: CT head: No bleed  Radiologist interpretation:   CT-HEAD W/O   Final Result      No acute intracranial abnormality.                       COURSE & MEDICAL DECISION MAKING     ED Observation Status? Yes; I am placing the patient in to an observation status due to a diagnostic uncertainty as well as therapeutic intensity. Patient placed in observation status at 3:56 PM, 4/14/2023.     Observation plan is as follows: Diagnostic imaging    Upon Reevaluation, the patient's condition has: Improved; and will be discharged.    Patient discharged from ED Observation status at 5:18 PM (Time) (4/14/2023) (Date).     INITIAL ASSESSMENT,  COURSE AND PLAN  Care Narrative: Patient presents with numbness of her left upper extremity in a glove like distribution to the shoulder.  This does not fit with a specific peripheral neurologic distribution.  No evidence of vascular involvement.  Reviewing her medical record, she has had extensive work-up including MRI, CTA head neck, which were reassuring.  She is known to have a PFO, however given that she has been worked up for the same symptoms in the past without evidence of stroke, low suspicion for stroke at this time.  I discussed this with neurology, who agreed with CT of the head, able to follow-up with her outpatient neurologist if reassuring.  CT scan of the head demonstrates no signs of intracranial bleed, mass, other abnormalities.  Low suspicion for venous sinus thrombosis    3:56 PM - Patient was evaluated at bedside. Ordered imaging to evaluate. Patient and/or family verbalizes understanding to the plan of care. Paged Neurology.    4:17 PM I discussed the patient's case and the above findings with Dr. Doty (Neurology) who agrees with plan for CT Head w/o contrast. They recommend trying anxiety medications. If patient's CT is reassuring, they can be discharged and f/u up with them as an outpatient. Patient was treated with clonazepam 0.5 mg.    5:18 PM - Patient was reevaluated at bedside. Patient is feeling improved after medications, but is now complaining of nausea. Discussed lab and radiology results with the patient and/or family.  The plan for discharge was discussed. Patient and/or family was given the opportunity to ask any questions. Patient and/or family verbalizes understanding and agreement to this plan of care.         DISPOSITION AND DISCUSSIONS  I have discussed management of the patient with the following physicians and RADHA's:  Dr. Doty (Neurology)    Discussion of management with other [facility] or appropriate source(s): None       Decision tools and prescription drugs considered  including, but not limited to: NIH Stroke Scale 1 .    The patient will return for new or worsening symptoms and is stable at the time of discharge.    The patient is referred to a primary physician for blood pressure management, diabetic screening, and for all other preventative health concerns.    DISPOSITION:  Patient will be discharged home in stable condition.    FOLLOW UP:  ARSLAN Montero  [address]  [facility] [id number] [phone]    Schedule an appointment as soon as possible for a visit       Your neurologist    Schedule an appointment as soon as possible for a visit       [facility], Emergency Dept  [address] [facility] [address] [phone]    If symptoms worsen        FINAL DIANGOSIS  1. Numbness         Laci HUITRON (Scribe), am scribing for, and in the presence of, Joaquim Davidson M.D..    Electronically signed by: Laci Chung (Scribe), 4/14/2023    IJoaquim M.D. personally performed the services described in this documentation, as scribed by Laci Chung in my presence, and it is both accurate and complete.      The note accurately reflects work and decisions made by me.  Joaquim Davidson M.D.  4/14/2023  10:26 PM

## 2023-04-14 NOTE — ED TRIAGE NOTES
Chief Complaint   Patient presents with    Numbness     Pt presents to ED for Lt arm numbness, shakes, and dizziness. Pt states this has happened before. Pt Neg on stroke scale. Noticeable weakness in Lt arm.     Pt educated upon triage process and told to inform  staff of any changes in condition so that Pt may be reassessed. No further questions at this time. Pt sitting out in lobby.

## 2023-04-15 NOTE — DISCHARGE INSTRUCTIONS
You were seen in the emergency department for shakiness and numbness in the left arm.  Your CAT scan is reassuring.  This case was discussed with our neurologist, who recommends further outpatient work-up.    At this time there is no evidence of a dangerous condition causing your symptoms.    Please follow-up with your neurologist.    Please return to the emergency department or seek medical attention if you develop:  Slurred speech, weakness, any other new or concerning findings

## 2023-04-15 NOTE — ED NOTES
Discharge instructions given.  All questions answered.  Pt to follow-up with Neurologist, return to ER if symptoms worsen as discussed.  Pt verbalized understanding.  All belongings with pt.  Pt ambulated to lobby.

## 2023-08-08 ENCOUNTER — HOSPITAL ENCOUNTER (EMERGENCY)
Facility: MEDICAL CENTER | Age: 33
End: 2023-08-08
Attending: EMERGENCY MEDICINE
Payer: MEDICAID

## 2023-08-08 ENCOUNTER — APPOINTMENT (OUTPATIENT)
Dept: RADIOLOGY | Facility: MEDICAL CENTER | Age: 33
End: 2023-08-08
Attending: EMERGENCY MEDICINE
Payer: MEDICAID

## 2023-08-08 VITALS
WEIGHT: 186.73 LBS | DIASTOLIC BLOOD PRESSURE: 82 MMHG | BODY MASS INDEX: 36.66 KG/M2 | TEMPERATURE: 97.5 F | HEART RATE: 80 BPM | RESPIRATION RATE: 16 BRPM | OXYGEN SATURATION: 97 % | SYSTOLIC BLOOD PRESSURE: 139 MMHG | HEIGHT: 60 IN

## 2023-08-08 DIAGNOSIS — M25.571 ACUTE RIGHT ANKLE PAIN: ICD-10-CM

## 2023-08-08 DIAGNOSIS — S96.911A RIGHT ANKLE STRAIN, INITIAL ENCOUNTER: ICD-10-CM

## 2023-08-08 PROCEDURE — 700111 HCHG RX REV CODE 636 W/ 250 OVERRIDE (IP): Mod: JZ,UD | Performed by: EMERGENCY MEDICINE

## 2023-08-08 PROCEDURE — 96372 THER/PROPH/DIAG INJ SC/IM: CPT

## 2023-08-08 PROCEDURE — 73610 X-RAY EXAM OF ANKLE: CPT | Mod: RT

## 2023-08-08 PROCEDURE — 99284 EMERGENCY DEPT VISIT MOD MDM: CPT

## 2023-08-08 RX ORDER — KETOROLAC TROMETHAMINE 10 MG/1
10 TABLET, FILM COATED ORAL 3 TIMES DAILY PRN
Qty: 15 TABLET | Refills: 0 | Status: SHIPPED | OUTPATIENT
Start: 2023-08-08

## 2023-08-08 RX ORDER — KETOROLAC TROMETHAMINE 30 MG/ML
60 INJECTION, SOLUTION INTRAMUSCULAR; INTRAVENOUS ONCE
Status: COMPLETED | OUTPATIENT
Start: 2023-08-08 | End: 2023-08-08

## 2023-08-08 RX ADMIN — KETOROLAC TROMETHAMINE 60 MG: 30 INJECTION, SOLUTION INTRAMUSCULAR; INTRAVENOUS at 22:09

## 2023-08-08 ASSESSMENT — FIBROSIS 4 INDEX: FIB4 SCORE: 0.31

## 2023-08-08 ASSESSMENT — PAIN DESCRIPTION - PAIN TYPE: TYPE: ACUTE PAIN

## 2023-08-09 NOTE — ED TRIAGE NOTES
".BP (!) 140/93   Pulse 98   Temp 36.4 °C (97.5 °F) (Temporal)   Resp 17   Ht 1.524 m (5')   Wt 84.7 kg (186 lb 11.7 oz)   SpO2 99%   BMI 36.47 kg/m²   .  .  Chief Complaint   Patient presents with    Ankle Pain     Pt c/c of R ankle pain that started yesterday. No trauma, no falls to ankle. Pt states \" I was cooking and I don't know if I shifted on it wrong\".   CMS in tact. Pt ambulated into triage.          "
no

## 2023-08-09 NOTE — ED NOTES
PT given AVS documentation. PT verbally acknowledges understanding of discharge instructions. VSS. All belongings accounted for by PT. PT ambulated to ED lobby with steady gait.

## 2023-08-09 NOTE — ED NOTES
PT encouraged to ED staff that she feels ok ambulating. PT ambulated with steady gait from ED lobby to Ortho 2. PT states she was cooking yesterday and after removing her shoe she experienced pain in her R ankle. No relief with motrin, ice, ace wraps. Mild swelling noted. +CSM.

## 2023-08-09 NOTE — DISCHARGE INSTRUCTIONS
Ice for the next 24 hours and elevate then starting tomorrow warm soaks in Epsom salts 1-2 times a day  Wear the splint that we are providing you with socks under it for the next 2 weeks except for bathing.  No work tomorrow  Take the medicine I am prescribing you as directed with food.

## 2023-08-09 NOTE — ED PROVIDER NOTES
"ER Provider Note    Scribed for Dr. Dori Louie D.O. by Krys Walker. 8/8/2023  9:30 PM    Primary Care Provider: Pcp Pt States None    CHIEF COMPLAINT  Chief Complaint   Patient presents with    Ankle Pain     Pt c/c of R ankle pain that started yesterday. No trauma, no falls to ankle. Pt states \" I was cooking and I don't know if I shifted on it wrong\".   CMS in tact. Pt ambulated into triage.        EXTERNAL RECORDS REVIEWED  Outpatient Notes Patient was last at ED on 4/14/23 for unrelated symptoms.    HPI/ROS  LIMITATION TO HISTORY   Select: : None    Tavia Dong is a 33 y.o. female who presents to the ED for ankle pain onset yesterday. She states her pain began yesterday after she was cooking, she shifted between cooking and the sink and attributes this pain to a sudden shift. She notes she went to work today, working as a  and is always on her feet. Patient denies trauma or falls to the ankles. Patient denies possibility of being pregnant.     PAST MEDICAL HISTORY  Past Medical History:   Diagnosis Date    GERD (gastroesophageal reflux disease)     Headache(784.0)      SURGICAL HISTORY  Past Surgical History:   Procedure Laterality Date    CHOLECYSTECTOMY  2013    due to gall stones.     FAMILY HISTORY  Family History   Problem Relation Age of Onset    Arthritis Mother     Alcohol/Drug Father         Drug & ETOH abuse    Hypertension Paternal Grandmother     Stroke Maternal Grandmother         X 1    Heart Disease Maternal Grandmother         MI    Hypertension Maternal Grandmother      SOCIAL HISTORY   reports that she has never smoked. She has never used smokeless tobacco. She reports that she does not drink alcohol and does not use drugs.    CURRENT MEDICATIONS  Current Outpatient Medications   Medication Instructions    aspirin EC (ECOTRIN) 81 mg, Oral, DAILY     ALLERGIES  Patient has no known allergies.    PHYSICAL EXAM  BP (!) 140/93   Pulse 98   Temp 36.4 °C (97.5 °F) " (Temporal)   Resp 17   Ht 1.524 m (5')   Wt 84.7 kg (186 lb 11.7 oz)   SpO2 99%   BMI 36.47 kg/m²   Constitutional: Patient is well developed, well nourished.    HENT: Normocephalic, atraumatic.  Moist oral mucosa.  Cardiovascular: Normal heart rate and Regular rhythm. No murmur,   Thorax & Lungs: Clear and equal breath sounds with good excursion. No respiratory distress  Abdomen: Bowel sounds normal in all four quadrants. Soft,nontender, no rebound , guarding, palpable masses.   Skin: Warm, Dry, No erythema, No rashes.   Extremities: Peripheral pulses 4/4 No edema, Mild soft tissue swelling on lateral malleolus.  Moderately tender tender over area of swelling and vascular intact.   Neurologic: Alert & oriented x 3, Normal motor function, Normal sensory function,   Psychiatric: Affect normal, Judgment normal, Mood normal.     DIAGNOSTIC STUDIES & PROCEDURES  Radiology:   The attending Emergency Physician has independently interpreted the diagnostic imaging associated with this visit and is awaiting the final reading from the radiologist, which will be displayed below.  Preliminary interpretation is a follows: No acute fracture or subluxation.  Radiologist interpretation:  DX-ANKLE 3+ VIEWS RIGHT   Final Result      No radiographic evidence of acute traumatic injury.         COURSE & MEDICAL DECISION MAKING    ED Observation Status? Yes; I am placing the patient in to an observation status due to a diagnostic uncertainty as well as therapeutic intensity. Patient placed in observation status at 9:30 PM, 8/8/2023.     Observation plan is as follows: We will manage their symptoms, evaluate with imaging and then reassess after results are reviewed     Upon Reevaluation, the patient's condition has: Improved; and will be discharged.    Patient discharged from ED Observation status at 10:10 PM (Time) 8/8/2023 (Date).     INITIAL ASSESSMENT AND PLAN  Care Narrative:       9:30 PM - Patient was seen and evaluated at  bedside. Patient presents to the ED for ankle pain onset yesterday. After my exam, I discussed with the patient the plan of care, which includes obtaining lab work and imaging for further evaluation. Patient understands and verbalizes agreement to plan of care. Ordered DX-ankle to evaluate. Differential diagnoses include but not limited to: ankle sprain vs fracture   X-ray reveals no fracture or subluxation.  Patient will be placed in a Aircast splint.    9:52 PM - Patient was reevaluated at bedside. Discussed radiology results with the patient and informed them of unremarkable results. She states she does not need crutches and would like a work note. I then informed the patient of my plan for discharge, which includes strict return precautions for any new or worsening symptoms. Patient understands and verbalizes agreement to plan of care. Patient is comfortable going home at this time.     HTN/IDDM FOLLOW UP:  The patient is referred to a primary physician for blood pressure management, diabetic screening, and for all other preventive health concerns               DISPOSITION AND DISCUSSIONS  I have discussed management of the patient with the following physicians and RADHA's: None    Discussion of management with other [facility] or appropriate source(s): None     Barriers to care at this time, including but not limited to:  None .     Decision tools and prescription drugs considered including, but not limited to: Pain Medications Toradol .    Patient will be discharged home.    FOLLOW UP:  [address] [address]  [phone]  Schedule an appointment as soon as possible for a visit in 1 week  As needed, If symptoms worsen    OUTPATIENT MEDICATIONS:  New Prescriptions    KETOROLAC (TORADOL) 10 MG TAB    Take 1 Tablet by mouth 3 times a day as needed for Moderate Pain. With food     FINAL IMPRESSION   1. Acute right ankle pain    2. Right ankle strain, initial encounter       Krys HUITRON  Aaron (Scribe), am scribing for, and in the presence of, Dori Louie D.O..    Electronically signed by: Krys Walker (Scribbella), 8/8/2023    I, Dori Louie D.O. personally performed the services described in this documentation, as scribed by Krys Walker in my presence, and it is both accurate and complete.    The note accurately reflects work and decisions made by me.  Dori Louie D.O.  8/9/2023  4:14 AM

## 2023-08-30 ENCOUNTER — OFFICE VISIT (OUTPATIENT)
Dept: URGENT CARE | Facility: CLINIC | Age: 33
End: 2023-08-30
Payer: MEDICAID

## 2023-08-30 VITALS
OXYGEN SATURATION: 96 % | RESPIRATION RATE: 16 BRPM | TEMPERATURE: 98 F | HEIGHT: 60 IN | WEIGHT: 180 LBS | HEART RATE: 92 BPM | DIASTOLIC BLOOD PRESSURE: 62 MMHG | BODY MASS INDEX: 35.34 KG/M2 | SYSTOLIC BLOOD PRESSURE: 108 MMHG

## 2023-08-30 DIAGNOSIS — B34.9 VIRAL ILLNESS: ICD-10-CM

## 2023-08-30 LAB
FLUAV RNA SPEC QL NAA+PROBE: NEGATIVE
FLUBV RNA SPEC QL NAA+PROBE: NEGATIVE
RSV RNA SPEC QL NAA+PROBE: NEGATIVE
SARS-COV-2 RNA RESP QL NAA+PROBE: NEGATIVE

## 2023-08-30 PROCEDURE — 3078F DIAST BP <80 MM HG: CPT | Performed by: PHYSICIAN ASSISTANT

## 2023-08-30 PROCEDURE — 99203 OFFICE O/P NEW LOW 30 MIN: CPT | Performed by: PHYSICIAN ASSISTANT

## 2023-08-30 PROCEDURE — 3074F SYST BP LT 130 MM HG: CPT | Performed by: PHYSICIAN ASSISTANT

## 2023-08-30 PROCEDURE — 0241U POCT CEPHEID COV-2, FLU A/B, RSV - PCR: CPT | Performed by: PHYSICIAN ASSISTANT

## 2023-08-30 RX ORDER — BACLOFEN 10 MG/1
TABLET ORAL
COMMUNITY
Start: 2023-08-18

## 2023-08-30 RX ORDER — AMITRIPTYLINE HYDROCHLORIDE 25 MG/1
TABLET, FILM COATED ORAL
COMMUNITY
Start: 2023-08-18

## 2023-08-30 RX ORDER — BENZONATATE 100 MG/1
100 CAPSULE ORAL 3 TIMES DAILY PRN
Qty: 30 CAPSULE | Refills: 0 | Status: SHIPPED | OUTPATIENT
Start: 2023-08-30

## 2023-08-30 RX ORDER — HYDROXYZINE HYDROCHLORIDE 25 MG/1
TABLET, FILM COATED ORAL
COMMUNITY
Start: 2023-08-07

## 2023-08-30 RX ORDER — SERTRALINE HYDROCHLORIDE 25 MG/1
25 TABLET, FILM COATED ORAL
COMMUNITY
Start: 2023-08-07

## 2023-08-30 RX ORDER — METHYLPREDNISOLONE 4 MG/1
TABLET ORAL
Qty: 21 TABLET | Refills: 0 | Status: SHIPPED | OUTPATIENT
Start: 2023-08-30

## 2023-08-30 ASSESSMENT — ENCOUNTER SYMPTOMS
CARDIOVASCULAR NEGATIVE: 1
HEADACHES: 1
COUGH: 1
FEVER: 1
MYALGIAS: 1
SORE THROAT: 1
DIARRHEA: 0
VOMITING: 0
SPUTUM PRODUCTION: 0
ABDOMINAL PAIN: 0
WEIGHT LOSS: 0
CHILLS: 1
DIZZINESS: 0
WHEEZING: 0
NAUSEA: 0
SHORTNESS OF BREATH: 0
RHINORRHEA: 1

## 2023-08-30 ASSESSMENT — FIBROSIS 4 INDEX: FIB4 SCORE: 0.31

## 2023-08-30 NOTE — LETTER
August 30, 2023         Patient: Tavia Dong   YOB: 1990   Date of Visit: 8/30/2023           To Whom it May Concern:    Tavia Coronel was seen in my clinic on 8/30/2023. Please excuse any absences from work this week due to acute illness.      If you have any questions or concerns, please don't hesitate to call.        Sincerely,           Austin Quiroga P.A.-C.  Electronically Signed

## 2023-08-30 NOTE — PROGRESS NOTES
Subjective     Tavia Dong is a very pleasant 33 y.o. female who presents with Cough, Sore Throat (Started Saturday ), Ear Pain, and Body Aches            Cough  This is a new problem. The current episode started in the past 7 days. The problem has been unchanged. The problem occurs every few minutes. The cough is Productive of sputum. Associated symptoms include chills, ear congestion, a fever, headaches, myalgias, nasal congestion, rhinorrhea and a sore throat. Pertinent negatives include no chest pain, ear pain, postnasal drip, rash, shortness of breath, weight loss or wheezing. She has tried OTC cough suppressant for the symptoms. The treatment provided mild relief. Her past medical history is significant for pneumonia.         PMH:  has a past medical history of GERD (gastroesophageal reflux disease) and Headache(784.0).    She has no past medical history of Addisons disease ([facility]), Adrenal disorder ([facility]), Allergy, unspecified not elsewhere classified, Anemia, Anxiety, Arrhythmia, Arthritis, Asthma, Asymptomatic human immunodeficiency virus (HIV) infection status ([facility]), Blood transfusion, without reported diagnosis, Cancer ([facility]), CHF (congestive heart failure) ([facility]), Chronic airway obstruction, not elsewhere classified, Clotting disorder ([facility]), Cushings syndrome ([facility]), Depression, Diabetic neuropathy ([facility]), Emphysema, Glaucoma, Goiter, Headache, classical migraine, Heart attack ([facility]), Heart murmur, Hyperlipidemia, Hypertension, IBD (inflammatory bowel disease), Kidney disease, Meningitis, Osteoporosis, unspecified, Parathyroid disorder ([facility]), Pituitary disease ([facility]), Seizure ([facility]), Sickle cell disease ([facility]), Stroke ([facility]), Substance abuse ([facility]), Thyroid disease, Tuberculosis, Type II or unspecified type diabetes mellitus without mention of complication, not stated as uncontrolled, Ulcer, Unspecified cataract, or Urinary tract infection, site not specified.  MEDS:   Current Outpatient Medications:      baclofen (LIORESAL) 10 MG Tab, TAKE 1 TABLET BY MOUTH AT BEDTIME FOR SPASMS OR NECK PAIN, Disp: , Rfl:     amitriptyline (ELAVIL) 25 MG Tab, TAKE 1 TABLET BY MOUTH AT BEDTIME FOR HEADACHE PREVENTION, Disp: , Rfl:     sertraline (ZOLOFT) 25 MG tablet, Take 25 mg by mouth every day. FOR ANXIETY, Disp: , Rfl:     hydrOXYzine HCl (ATARAX) 25 MG Tab, TAKE 1 TO 4 TABLETS BY MOUTH EVERY DAY AS NEEDED FOR ANXIETY OR INSOMNIA, Disp: , Rfl:     ketorolac (TORADOL) 10 MG Tab, Take 1 Tablet by mouth 3 times a day as needed for Moderate Pain. With food, Disp: 15 Tablet, Rfl: 0    aspirin EC (ECOTRIN) 81 MG Tablet Delayed Response, Take 1 Tablet by mouth every day. (Patient not taking: Reported on 8/30/2023), Disp: 30 Tablet, Rfl: 3  ALLERGIES: No Known Allergies  SURGHX:   Past Surgical History:   Procedure Laterality Date    CHOLECYSTECTOMY  2013    due to gall stones.     SOCHX:  reports that she has never smoked. She has never used smokeless tobacco. She reports that she does not drink alcohol and does not use drugs.  FH: family history includes Alcohol/Drug in her father; Arthritis in her mother; Heart Disease in her maternal grandmother; Hypertension in her maternal grandmother and paternal grandmother; Stroke in her maternal grandmother.      Review of Systems   Constitutional:  Positive for chills, fever and malaise/fatigue. Negative for weight loss.   HENT:  Positive for congestion, rhinorrhea and sore throat. Negative for ear pain and postnasal drip.    Respiratory:  Positive for cough. Negative for sputum production, shortness of breath and wheezing.    Cardiovascular: Negative.  Negative for chest pain.   Gastrointestinal:  Negative for abdominal pain, diarrhea, nausea and vomiting.   Musculoskeletal:  Positive for myalgias.   Skin:  Negative for rash.   Neurological:  Positive for headaches. Negative for dizziness.       Medications, Allergies, and current problem list reviewed today in Epic           Objective      /62   Pulse 92   Temp 36.7 °C (98 °F)   Resp 16   Ht 1.524 m (5')   Wt 81.6 kg (180 lb)   SpO2 96%   BMI 35.15 kg/m²      Physical Exam  Vitals and nursing note reviewed.   Constitutional:       General: She is not in acute distress.     Appearance: Normal appearance. She is well-developed. She is not ill-appearing, toxic-appearing or diaphoretic.   HENT:      Head: Normocephalic and atraumatic.      Right Ear: Tympanic membrane, ear canal and external ear normal.      Left Ear: Tympanic membrane, ear canal and external ear normal.      Nose: Congestion and rhinorrhea present.      Mouth/Throat:      Mouth: Mucous membranes are moist.      Pharynx: Posterior oropharyngeal erythema present. No oropharyngeal exudate.      Comments: Colored PND  Eyes:      General:         Right eye: No discharge.         Left eye: No discharge.      Conjunctiva/sclera: Conjunctivae normal.   Cardiovascular:      Rate and Rhythm: Normal rate and regular rhythm.      Pulses: Normal pulses.      Heart sounds: Normal heart sounds.   Pulmonary:      Effort: Pulmonary effort is normal. No respiratory distress.      Breath sounds: Normal breath sounds. No wheezing, rhonchi or rales.   Musculoskeletal:      Cervical back: Normal range of motion and neck supple.      Right lower leg: No edema.      Left lower leg: No edema.   Lymphadenopathy:      Cervical: Cervical adenopathy present.   Skin:     General: Skin is warm and dry.   Neurological:      General: No focal deficit present.      Mental Status: She is alert and oriented to person, place, and time. Mental status is at baseline.   Psychiatric:         Mood and Affect: Mood normal.         Behavior: Behavior normal.         Thought Content: Thought content normal.         Judgment: Judgment normal.                             Assessment & Plan     This is a very pleasant 33-year-old female presenting with viral URI symptoms for the past 5 days.  Did start with fever  chills and body aches with that has resolved.  She still having congestion, ear pain/fullness, sore throat and cough.  No shortness of breath, wheezing, chest pain, leg swelling or calf tenderness.  Eating and drinking normal without vomiting or diarrhea.  Patient is COVID vaccinated with no pertinent past respiratory history.  PO2 adequate vital signs normal.  Nasal congestion, pharynx erythema and cervical adenopathy noted.  Colored nasal discharge and PND appreciated.  Lungs are clear bilaterally without wheezing rhonchi or rales.  In clinic COVID, flu, RSV testing negative.  No signs of focal bacterial infection.  Patient to be treated for self-limiting viral URI with OTC meds and conservative measures.  Note for work provided.    1. Viral illness  benzonatate (TESSALON) 100 MG Cap    methylPREDNISolone (MEDROL DOSEPAK) 4 MG Tablet Therapy Pack    POCT CEPHEID COV-2, FLU A/B, RSV - PCR          I personally reviewed prior external notes and test results pertinent to today's visit. Return to clinic or go to ED if symptoms worsen or persist. Red flag symptoms and indications for ED discussed at length. Patient/Parent/Guardian voices understanding.  AVS with post-visit instructions provided or given verbally.  Follow-up with your primary care provider in 3-5 days. All side effects and potential interactions of prescribed medication discussed including allergic response, GI upset, tendon injury, rash, sedation, OCP effectiveness, etc.    Please note that this dictation was created using voice recognition software. I have made every reasonable attempt to correct obvious errors, but I expect that there are errors of grammar and possibly content that I did not discover before finalizing the note.

## 2024-03-11 ENCOUNTER — APPOINTMENT (OUTPATIENT)
Dept: RADIOLOGY | Facility: MEDICAL CENTER | Age: 34
End: 2024-03-11
Attending: EMERGENCY MEDICINE
Payer: MEDICAID

## 2024-03-11 ENCOUNTER — HOSPITAL ENCOUNTER (OUTPATIENT)
Facility: MEDICAL CENTER | Age: 34
End: 2024-03-12
Attending: EMERGENCY MEDICINE | Admitting: SURGERY
Payer: MEDICAID

## 2024-03-11 DIAGNOSIS — K35.30 ACUTE APPENDICITIS WITH LOCALIZED PERITONITIS, WITHOUT PERFORATION OR ABSCESS, UNSPECIFIED WHETHER GANGRENE PRESENT: ICD-10-CM

## 2024-03-11 LAB
ALBUMIN SERPL BCP-MCNC: 5 G/DL (ref 3.2–4.9)
ALBUMIN/GLOB SERPL: 1.6 G/DL
ALP SERPL-CCNC: 128 U/L (ref 30–99)
ALT SERPL-CCNC: 28 U/L (ref 2–50)
ANION GAP SERPL CALC-SCNC: 13 MMOL/L (ref 7–16)
APPEARANCE UR: ABNORMAL
AST SERPL-CCNC: 19 U/L (ref 12–45)
BACTERIA #/AREA URNS HPF: ABNORMAL /HPF
BASOPHILS # BLD AUTO: 0.3 % (ref 0–1.8)
BASOPHILS # BLD: 0.05 K/UL (ref 0–0.12)
BILIRUB SERPL-MCNC: 0.5 MG/DL (ref 0.1–1.5)
BILIRUB UR QL STRIP.AUTO: NEGATIVE
BUN SERPL-MCNC: 15 MG/DL (ref 8–22)
CALCIUM ALBUM COR SERPL-MCNC: 8.3 MG/DL (ref 8.5–10.5)
CALCIUM SERPL-MCNC: 9.1 MG/DL (ref 8.5–10.5)
CHLORIDE SERPL-SCNC: 103 MMOL/L (ref 96–112)
CO2 SERPL-SCNC: 23 MMOL/L (ref 20–33)
COLOR UR: YELLOW
CREAT SERPL-MCNC: 0.74 MG/DL (ref 0.5–1.4)
EOSINOPHIL # BLD AUTO: 0.04 K/UL (ref 0–0.51)
EOSINOPHIL NFR BLD: 0.3 % (ref 0–6.9)
EPI CELLS #/AREA URNS HPF: ABNORMAL /HPF
ERYTHROCYTE [DISTWIDTH] IN BLOOD BY AUTOMATED COUNT: 41.2 FL (ref 35.9–50)
GFR SERPLBLD CREATININE-BSD FMLA CKD-EPI: 107 ML/MIN/1.73 M 2
GLOBULIN SER CALC-MCNC: 3.2 G/DL (ref 1.9–3.5)
GLUCOSE SERPL-MCNC: 110 MG/DL (ref 65–99)
GLUCOSE UR STRIP.AUTO-MCNC: NEGATIVE MG/DL
HCG SERPL QL: NEGATIVE
HCT VFR BLD AUTO: 43.4 % (ref 37–47)
HGB BLD-MCNC: 14.7 G/DL (ref 12–16)
HYALINE CASTS #/AREA URNS LPF: ABNORMAL /LPF
IMM GRANULOCYTES # BLD AUTO: 0.06 K/UL (ref 0–0.11)
IMM GRANULOCYTES NFR BLD AUTO: 0.4 % (ref 0–0.9)
KETONES UR STRIP.AUTO-MCNC: NEGATIVE MG/DL
LEUKOCYTE ESTERASE UR QL STRIP.AUTO: ABNORMAL
LIPASE SERPL-CCNC: 18 U/L (ref 11–82)
LYMPHOCYTES # BLD AUTO: 1.6 K/UL (ref 1–4.8)
LYMPHOCYTES NFR BLD: 10.7 % (ref 22–41)
MCH RBC QN AUTO: 28.5 PG (ref 27–33)
MCHC RBC AUTO-ENTMCNC: 33.9 G/DL (ref 32.2–35.5)
MCV RBC AUTO: 84.1 FL (ref 81.4–97.8)
MICRO URNS: ABNORMAL
MONOCYTES # BLD AUTO: 0.4 K/UL (ref 0–0.85)
MONOCYTES NFR BLD AUTO: 2.7 % (ref 0–13.4)
NEUTROPHILS # BLD AUTO: 12.78 K/UL (ref 1.82–7.42)
NEUTROPHILS NFR BLD: 85.6 % (ref 44–72)
NITRITE UR QL STRIP.AUTO: NEGATIVE
NRBC # BLD AUTO: 0 K/UL
NRBC BLD-RTO: 0 /100 WBC (ref 0–0.2)
PH UR STRIP.AUTO: 5.5 [PH] (ref 5–8)
PLATELET # BLD AUTO: 410 K/UL (ref 164–446)
PMV BLD AUTO: 9.2 FL (ref 9–12.9)
POTASSIUM SERPL-SCNC: 3.2 MMOL/L (ref 3.6–5.5)
PROT SERPL-MCNC: 8.2 G/DL (ref 6–8.2)
PROT UR QL STRIP: 30 MG/DL
RBC # BLD AUTO: 5.16 M/UL (ref 4.2–5.4)
RBC # URNS HPF: ABNORMAL /HPF
RBC UR QL AUTO: ABNORMAL
SODIUM SERPL-SCNC: 139 MMOL/L (ref 135–145)
SP GR UR STRIP.AUTO: 1.03
UROBILINOGEN UR STRIP.AUTO-MCNC: 0.2 MG/DL
WBC # BLD AUTO: 14.9 K/UL (ref 4.8–10.8)
WBC #/AREA URNS HPF: ABNORMAL /HPF

## 2024-03-11 PROCEDURE — 36415 COLL VENOUS BLD VENIPUNCTURE: CPT

## 2024-03-11 PROCEDURE — 84703 CHORIONIC GONADOTROPIN ASSAY: CPT

## 2024-03-11 PROCEDURE — 700111 HCHG RX REV CODE 636 W/ 250 OVERRIDE (IP): Mod: JZ,UD | Performed by: EMERGENCY MEDICINE

## 2024-03-11 PROCEDURE — 80053 COMPREHEN METABOLIC PANEL: CPT

## 2024-03-11 PROCEDURE — 96375 TX/PRO/DX INJ NEW DRUG ADDON: CPT

## 2024-03-11 PROCEDURE — 74177 CT ABD & PELVIS W/CONTRAST: CPT

## 2024-03-11 PROCEDURE — 96365 THER/PROPH/DIAG IV INF INIT: CPT

## 2024-03-11 PROCEDURE — 85025 COMPLETE CBC W/AUTO DIFF WBC: CPT

## 2024-03-11 PROCEDURE — 99291 CRITICAL CARE FIRST HOUR: CPT

## 2024-03-11 PROCEDURE — 700117 HCHG RX CONTRAST REV CODE 255: Mod: UD | Performed by: EMERGENCY MEDICINE

## 2024-03-11 PROCEDURE — 81001 URINALYSIS AUTO W/SCOPE: CPT

## 2024-03-11 PROCEDURE — 83690 ASSAY OF LIPASE: CPT

## 2024-03-11 RX ORDER — CEFTRIAXONE 2 G/1
2000 INJECTION, POWDER, FOR SOLUTION INTRAMUSCULAR; INTRAVENOUS ONCE
Status: COMPLETED | OUTPATIENT
Start: 2024-03-11 | End: 2024-03-11

## 2024-03-11 RX ORDER — METRONIDAZOLE 500 MG/100ML
500 INJECTION, SOLUTION INTRAVENOUS EVERY 12 HOURS
Status: COMPLETED | OUTPATIENT
Start: 2024-03-11 | End: 2024-03-12

## 2024-03-11 RX ORDER — MORPHINE SULFATE 4 MG/ML
4 INJECTION INTRAVENOUS ONCE
Status: COMPLETED | OUTPATIENT
Start: 2024-03-11 | End: 2024-03-11

## 2024-03-11 RX ORDER — ONDANSETRON 2 MG/ML
4 INJECTION INTRAMUSCULAR; INTRAVENOUS ONCE
Status: COMPLETED | OUTPATIENT
Start: 2024-03-11 | End: 2024-03-11

## 2024-03-11 RX ORDER — AMOXICILLIN AND CLAVULANATE POTASSIUM 875; 125 MG/1; MG/1
1 TABLET, FILM COATED ORAL 2 TIMES DAILY
Qty: 14 TABLET | Refills: 0 | Status: ACTIVE | OUTPATIENT
Start: 2024-03-11 | End: 2024-03-12

## 2024-03-11 RX ORDER — HYDROCODONE BITARTRATE AND ACETAMINOPHEN 5; 325 MG/1; MG/1
1 TABLET ORAL EVERY 6 HOURS PRN
Qty: 5 TABLET | Refills: 0 | Status: SHIPPED | OUTPATIENT
Start: 2024-03-11 | End: 2024-03-13

## 2024-03-11 RX ADMIN — METRONIDAZOLE 500 MG: 500 INJECTION, SOLUTION INTRAVENOUS at 23:38

## 2024-03-11 RX ADMIN — ONDANSETRON 4 MG: 2 INJECTION INTRAMUSCULAR; INTRAVENOUS at 21:19

## 2024-03-11 RX ADMIN — CEFTRIAXONE SODIUM 2000 MG: 2 INJECTION, POWDER, FOR SOLUTION INTRAMUSCULAR; INTRAVENOUS at 23:27

## 2024-03-11 RX ADMIN — IOHEXOL 100 ML: 350 INJECTION, SOLUTION INTRAVENOUS at 22:43

## 2024-03-11 RX ADMIN — MORPHINE SULFATE 4 MG: 4 INJECTION, SOLUTION INTRAMUSCULAR; INTRAVENOUS at 21:19

## 2024-03-11 ASSESSMENT — PAIN DESCRIPTION - PAIN TYPE: TYPE: ACUTE PAIN

## 2024-03-12 ENCOUNTER — ANESTHESIA (OUTPATIENT)
Dept: SURGERY | Facility: MEDICAL CENTER | Age: 34
End: 2024-03-12
Payer: MEDICAID

## 2024-03-12 ENCOUNTER — ANESTHESIA EVENT (OUTPATIENT)
Dept: SURGERY | Facility: MEDICAL CENTER | Age: 34
End: 2024-03-12
Payer: MEDICAID

## 2024-03-12 VITALS
HEART RATE: 95 BPM | SYSTOLIC BLOOD PRESSURE: 129 MMHG | HEIGHT: 60 IN | WEIGHT: 182.1 LBS | TEMPERATURE: 97.7 F | DIASTOLIC BLOOD PRESSURE: 88 MMHG | BODY MASS INDEX: 35.75 KG/M2 | OXYGEN SATURATION: 93 % | RESPIRATION RATE: 18 BRPM

## 2024-03-12 PROBLEM — K35.80 APPENDICITIS, ACUTE: Status: ACTIVE | Noted: 2024-03-12

## 2024-03-12 LAB
ANION GAP SERPL CALC-SCNC: 15 MMOL/L (ref 7–16)
BUN SERPL-MCNC: 10 MG/DL (ref 8–22)
CALCIUM SERPL-MCNC: 8.7 MG/DL (ref 8.5–10.5)
CHLORIDE SERPL-SCNC: 102 MMOL/L (ref 96–112)
CO2 SERPL-SCNC: 20 MMOL/L (ref 20–33)
CREAT SERPL-MCNC: 0.74 MG/DL (ref 0.5–1.4)
GFR SERPLBLD CREATININE-BSD FMLA CKD-EPI: 107 ML/MIN/1.73 M 2
GLUCOSE SERPL-MCNC: 135 MG/DL (ref 65–99)
PATHOLOGY CONSULT NOTE: NORMAL
POTASSIUM SERPL-SCNC: 3.1 MMOL/L (ref 3.6–5.5)
SODIUM SERPL-SCNC: 137 MMOL/L (ref 135–145)

## 2024-03-12 PROCEDURE — 700111 HCHG RX REV CODE 636 W/ 250 OVERRIDE (IP): Performed by: SURGERY

## 2024-03-12 PROCEDURE — G0378 HOSPITAL OBSERVATION PER HR: HCPCS

## 2024-03-12 PROCEDURE — 99024 POSTOP FOLLOW-UP VISIT: CPT | Mod: MISDOCU | Performed by: NURSE PRACTITIONER

## 2024-03-12 PROCEDURE — A9270 NON-COVERED ITEM OR SERVICE: HCPCS | Mod: JZ,UD | Performed by: NURSE PRACTITIONER

## 2024-03-12 PROCEDURE — 160009 HCHG ANES TIME/MIN: Performed by: SURGERY

## 2024-03-12 PROCEDURE — 700102 HCHG RX REV CODE 250 W/ 637 OVERRIDE(OP): Mod: UD | Performed by: ANESTHESIOLOGY

## 2024-03-12 PROCEDURE — 700111 HCHG RX REV CODE 636 W/ 250 OVERRIDE (IP): Mod: UD | Performed by: ANESTHESIOLOGY

## 2024-03-12 PROCEDURE — 700101 HCHG RX REV CODE 250: Mod: UD | Performed by: ANESTHESIOLOGY

## 2024-03-12 PROCEDURE — 160002 HCHG RECOVERY MINUTES (STAT): Performed by: SURGERY

## 2024-03-12 PROCEDURE — 160029 HCHG SURGERY MINUTES - 1ST 30 MINS LEVEL 4: Performed by: SURGERY

## 2024-03-12 PROCEDURE — 700105 HCHG RX REV CODE 258: Mod: UD | Performed by: ANESTHESIOLOGY

## 2024-03-12 PROCEDURE — 700111 HCHG RX REV CODE 636 W/ 250 OVERRIDE (IP): Mod: JZ,UD | Performed by: ANESTHESIOLOGY

## 2024-03-12 PROCEDURE — A9270 NON-COVERED ITEM OR SERVICE: HCPCS | Mod: UD | Performed by: ANESTHESIOLOGY

## 2024-03-12 PROCEDURE — 80048 BASIC METABOLIC PNL TOTAL CA: CPT

## 2024-03-12 PROCEDURE — 700102 HCHG RX REV CODE 250 W/ 637 OVERRIDE(OP): Mod: JZ,UD | Performed by: NURSE PRACTITIONER

## 2024-03-12 PROCEDURE — 160035 HCHG PACU - 1ST 60 MINS PHASE I: Performed by: SURGERY

## 2024-03-12 PROCEDURE — A9270 NON-COVERED ITEM OR SERVICE: HCPCS | Mod: UD | Performed by: SURGERY

## 2024-03-12 PROCEDURE — 700101 HCHG RX REV CODE 250: Mod: UD | Performed by: SURGERY

## 2024-03-12 PROCEDURE — 700102 HCHG RX REV CODE 250 W/ 637 OVERRIDE(OP): Mod: UD | Performed by: SURGERY

## 2024-03-12 PROCEDURE — 700105 HCHG RX REV CODE 258: Mod: UD | Performed by: SURGERY

## 2024-03-12 PROCEDURE — 88304 TISSUE EXAM BY PATHOLOGIST: CPT

## 2024-03-12 PROCEDURE — 96375 TX/PRO/DX INJ NEW DRUG ADDON: CPT

## 2024-03-12 PROCEDURE — 160041 HCHG SURGERY MINUTES - EA ADDL 1 MIN LEVEL 4: Performed by: SURGERY

## 2024-03-12 PROCEDURE — 99222 1ST HOSP IP/OBS MODERATE 55: CPT | Mod: 57 | Performed by: SURGERY

## 2024-03-12 PROCEDURE — 160036 HCHG PACU - EA ADDL 30 MINS PHASE I: Performed by: SURGERY

## 2024-03-12 PROCEDURE — 700101 HCHG RX REV CODE 250: Performed by: ANESTHESIOLOGY

## 2024-03-12 PROCEDURE — 160048 HCHG OR STATISTICAL LEVEL 1-5: Performed by: SURGERY

## 2024-03-12 RX ORDER — ONDANSETRON 2 MG/ML
4 INJECTION INTRAMUSCULAR; INTRAVENOUS EVERY 4 HOURS PRN
Status: DISCONTINUED | OUTPATIENT
Start: 2024-03-12 | End: 2024-03-12 | Stop reason: HOSPADM

## 2024-03-12 RX ORDER — LIDOCAINE HYDROCHLORIDE 20 MG/ML
INJECTION, SOLUTION EPIDURAL; INFILTRATION; INTRACAUDAL; PERINEURAL PRN
Status: DISCONTINUED | OUTPATIENT
Start: 2024-03-12 | End: 2024-03-12 | Stop reason: SURG

## 2024-03-12 RX ORDER — KETOROLAC TROMETHAMINE 15 MG/ML
INJECTION, SOLUTION INTRAMUSCULAR; INTRAVENOUS PRN
Status: DISCONTINUED | OUTPATIENT
Start: 2024-03-12 | End: 2024-03-12 | Stop reason: SURG

## 2024-03-12 RX ORDER — POTASSIUM CHLORIDE 20 MEQ/1
40 TABLET, EXTENDED RELEASE ORAL ONCE
Status: COMPLETED | OUTPATIENT
Start: 2024-03-12 | End: 2024-03-12

## 2024-03-12 RX ORDER — OXYCODONE HCL 5 MG/5 ML
10 SOLUTION, ORAL ORAL
Status: COMPLETED | OUTPATIENT
Start: 2024-03-12 | End: 2024-03-12

## 2024-03-12 RX ORDER — AMOXICILLIN 250 MG
1 CAPSULE ORAL
Status: DISCONTINUED | OUTPATIENT
Start: 2024-03-12 | End: 2024-03-12 | Stop reason: HOSPADM

## 2024-03-12 RX ORDER — ACETAMINOPHEN 500 MG
1000 TABLET ORAL EVERY 6 HOURS PRN
COMMUNITY
Start: 2024-03-12

## 2024-03-12 RX ORDER — ROCURONIUM BROMIDE 10 MG/ML
INJECTION, SOLUTION INTRAVENOUS PRN
Status: DISCONTINUED | OUTPATIENT
Start: 2024-03-12 | End: 2024-03-12 | Stop reason: SURG

## 2024-03-12 RX ORDER — ENOXAPARIN SODIUM 100 MG/ML
30 INJECTION SUBCUTANEOUS EVERY 12 HOURS
Status: DISCONTINUED | OUTPATIENT
Start: 2024-03-13 | End: 2024-03-12 | Stop reason: HOSPADM

## 2024-03-12 RX ORDER — MEPERIDINE HYDROCHLORIDE 25 MG/ML
12.5 INJECTION INTRAMUSCULAR; INTRAVENOUS; SUBCUTANEOUS
Status: DISCONTINUED | OUTPATIENT
Start: 2024-03-12 | End: 2024-03-12 | Stop reason: HOSPADM

## 2024-03-12 RX ORDER — ONDANSETRON 4 MG/1
4 TABLET, ORALLY DISINTEGRATING ORAL EVERY 4 HOURS PRN
Status: DISCONTINUED | OUTPATIENT
Start: 2024-03-12 | End: 2024-03-12 | Stop reason: HOSPADM

## 2024-03-12 RX ORDER — AMOXICILLIN 250 MG
1 CAPSULE ORAL NIGHTLY
Status: DISCONTINUED | OUTPATIENT
Start: 2024-03-12 | End: 2024-03-12 | Stop reason: HOSPADM

## 2024-03-12 RX ORDER — HALOPERIDOL 5 MG/ML
1 INJECTION INTRAMUSCULAR
Status: DISCONTINUED | OUTPATIENT
Start: 2024-03-12 | End: 2024-03-12 | Stop reason: HOSPADM

## 2024-03-12 RX ORDER — HYDROMORPHONE HYDROCHLORIDE 1 MG/ML
0.1 INJECTION, SOLUTION INTRAMUSCULAR; INTRAVENOUS; SUBCUTANEOUS
Status: DISCONTINUED | OUTPATIENT
Start: 2024-03-12 | End: 2024-03-12 | Stop reason: HOSPADM

## 2024-03-12 RX ORDER — POLYETHYLENE GLYCOL 3350 17 G/17G
1 POWDER, FOR SOLUTION ORAL 2 TIMES DAILY
Status: DISCONTINUED | OUTPATIENT
Start: 2024-03-12 | End: 2024-03-12 | Stop reason: HOSPADM

## 2024-03-12 RX ORDER — SODIUM CHLORIDE, SODIUM LACTATE, POTASSIUM CHLORIDE, CALCIUM CHLORIDE 600; 310; 30; 20 MG/100ML; MG/100ML; MG/100ML; MG/100ML
INJECTION, SOLUTION INTRAVENOUS CONTINUOUS
Status: DISCONTINUED | OUTPATIENT
Start: 2024-03-12 | End: 2024-03-12 | Stop reason: HOSPADM

## 2024-03-12 RX ORDER — SODIUM CHLORIDE, SODIUM LACTATE, POTASSIUM CHLORIDE, CALCIUM CHLORIDE 600; 310; 30; 20 MG/100ML; MG/100ML; MG/100ML; MG/100ML
INJECTION, SOLUTION INTRAVENOUS
Status: DISCONTINUED | OUTPATIENT
Start: 2024-03-12 | End: 2024-03-12 | Stop reason: SURG

## 2024-03-12 RX ORDER — HYDROMORPHONE HYDROCHLORIDE 1 MG/ML
0.4 INJECTION, SOLUTION INTRAMUSCULAR; INTRAVENOUS; SUBCUTANEOUS
Status: DISCONTINUED | OUTPATIENT
Start: 2024-03-12 | End: 2024-03-12 | Stop reason: HOSPADM

## 2024-03-12 RX ORDER — OXYCODONE HYDROCHLORIDE 5 MG/1
5 TABLET ORAL
Status: DISCONTINUED | OUTPATIENT
Start: 2024-03-12 | End: 2024-03-12 | Stop reason: HOSPADM

## 2024-03-12 RX ORDER — DOCUSATE SODIUM 100 MG/1
100 CAPSULE, LIQUID FILLED ORAL 2 TIMES DAILY
Status: DISCONTINUED | OUTPATIENT
Start: 2024-03-12 | End: 2024-03-12 | Stop reason: HOSPADM

## 2024-03-12 RX ORDER — HYDROMORPHONE HYDROCHLORIDE 1 MG/ML
0.2 INJECTION, SOLUTION INTRAMUSCULAR; INTRAVENOUS; SUBCUTANEOUS
Status: DISCONTINUED | OUTPATIENT
Start: 2024-03-12 | End: 2024-03-12 | Stop reason: HOSPADM

## 2024-03-12 RX ORDER — HYDROMORPHONE HYDROCHLORIDE 1 MG/ML
0.5 INJECTION, SOLUTION INTRAMUSCULAR; INTRAVENOUS; SUBCUTANEOUS
Status: DISCONTINUED | OUTPATIENT
Start: 2024-03-12 | End: 2024-03-12 | Stop reason: HOSPADM

## 2024-03-12 RX ORDER — IBUPROFEN 200 MG
800 TABLET ORAL 3 TIMES DAILY PRN
Status: DISCONTINUED | OUTPATIENT
Start: 2024-03-15 | End: 2024-03-12 | Stop reason: HOSPADM

## 2024-03-12 RX ORDER — DIPHENHYDRAMINE HYDROCHLORIDE 50 MG/ML
12.5 INJECTION INTRAMUSCULAR; INTRAVENOUS
Status: DISCONTINUED | OUTPATIENT
Start: 2024-03-12 | End: 2024-03-12 | Stop reason: HOSPADM

## 2024-03-12 RX ORDER — ONDANSETRON 2 MG/ML
4 INJECTION INTRAMUSCULAR; INTRAVENOUS
Status: DISCONTINUED | OUTPATIENT
Start: 2024-03-12 | End: 2024-03-12 | Stop reason: HOSPADM

## 2024-03-12 RX ORDER — BISACODYL 10 MG
10 SUPPOSITORY, RECTAL RECTAL
Status: DISCONTINUED | OUTPATIENT
Start: 2024-03-12 | End: 2024-03-12 | Stop reason: HOSPADM

## 2024-03-12 RX ORDER — ENEMA 19; 7 G/133ML; G/133ML
1 ENEMA RECTAL
Status: DISCONTINUED | OUTPATIENT
Start: 2024-03-12 | End: 2024-03-12 | Stop reason: HOSPADM

## 2024-03-12 RX ORDER — ACETAMINOPHEN 500 MG
1000 TABLET ORAL EVERY 6 HOURS
Qty: 40 TABLET | Refills: 0 | Status: DISCONTINUED | OUTPATIENT
Start: 2024-03-12 | End: 2024-03-12 | Stop reason: HOSPADM

## 2024-03-12 RX ORDER — ONDANSETRON 2 MG/ML
INJECTION INTRAMUSCULAR; INTRAVENOUS PRN
Status: DISCONTINUED | OUTPATIENT
Start: 2024-03-12 | End: 2024-03-12 | Stop reason: SURG

## 2024-03-12 RX ORDER — BUPIVACAINE HYDROCHLORIDE AND EPINEPHRINE 2.5; 5 MG/ML; UG/ML
INJECTION, SOLUTION EPIDURAL; INFILTRATION; INTRACAUDAL; PERINEURAL
Status: DISCONTINUED | OUTPATIENT
Start: 2024-03-12 | End: 2024-03-12 | Stop reason: HOSPADM

## 2024-03-12 RX ORDER — ACETAMINOPHEN 500 MG
1000 TABLET ORAL EVERY 6 HOURS PRN
Status: DISCONTINUED | OUTPATIENT
Start: 2024-03-17 | End: 2024-03-12 | Stop reason: HOSPADM

## 2024-03-12 RX ORDER — KETOROLAC TROMETHAMINE 15 MG/ML
15 INJECTION, SOLUTION INTRAMUSCULAR; INTRAVENOUS EVERY 6 HOURS
Qty: 12 ML | Refills: 0 | Status: DISCONTINUED | OUTPATIENT
Start: 2024-03-12 | End: 2024-03-12 | Stop reason: HOSPADM

## 2024-03-12 RX ORDER — OXYCODONE HYDROCHLORIDE 10 MG/1
10 TABLET ORAL
Status: DISCONTINUED | OUTPATIENT
Start: 2024-03-12 | End: 2024-03-12 | Stop reason: HOSPADM

## 2024-03-12 RX ORDER — DEXAMETHASONE SODIUM PHOSPHATE 4 MG/ML
INJECTION, SOLUTION INTRA-ARTICULAR; INTRALESIONAL; INTRAMUSCULAR; INTRAVENOUS; SOFT TISSUE PRN
Status: DISCONTINUED | OUTPATIENT
Start: 2024-03-12 | End: 2024-03-12 | Stop reason: SURG

## 2024-03-12 RX ORDER — OXYCODONE HCL 5 MG/5 ML
5 SOLUTION, ORAL ORAL
Status: COMPLETED | OUTPATIENT
Start: 2024-03-12 | End: 2024-03-12

## 2024-03-12 RX ADMIN — KETOROLAC TROMETHAMINE 15 MG: 15 INJECTION, SOLUTION INTRAMUSCULAR; INTRAVENOUS at 06:15

## 2024-03-12 RX ADMIN — FENTANYL CITRATE 50 MCG: 50 INJECTION, SOLUTION INTRAMUSCULAR; INTRAVENOUS at 03:00

## 2024-03-12 RX ADMIN — LIDOCAINE HYDROCHLORIDE 100 MG: 20 INJECTION, SOLUTION EPIDURAL; INFILTRATION; INTRACAUDAL at 01:08

## 2024-03-12 RX ADMIN — SUGAMMADEX 200 MG: 100 INJECTION, SOLUTION INTRAVENOUS at 01:31

## 2024-03-12 RX ADMIN — SODIUM CHLORIDE, POTASSIUM CHLORIDE, SODIUM LACTATE AND CALCIUM CHLORIDE: 600; 310; 30; 20 INJECTION, SOLUTION INTRAVENOUS at 03:53

## 2024-03-12 RX ADMIN — DOCUSATE SODIUM 100 MG: 100 CAPSULE, LIQUID FILLED ORAL at 06:15

## 2024-03-12 RX ADMIN — PROPOFOL 200 MG: 10 INJECTION, EMULSION INTRAVENOUS at 01:08

## 2024-03-12 RX ADMIN — SODIUM CHLORIDE, POTASSIUM CHLORIDE, SODIUM LACTATE AND CALCIUM CHLORIDE: 600; 310; 30; 20 INJECTION, SOLUTION INTRAVENOUS at 01:09

## 2024-03-12 RX ADMIN — POTASSIUM CHLORIDE 40 MEQ: 1500 TABLET, EXTENDED RELEASE ORAL at 09:19

## 2024-03-12 RX ADMIN — FENTANYL CITRATE 50 MCG: 50 INJECTION, SOLUTION INTRAMUSCULAR; INTRAVENOUS at 01:25

## 2024-03-12 RX ADMIN — FENTANYL CITRATE 25 MCG: 50 INJECTION, SOLUTION INTRAMUSCULAR; INTRAVENOUS at 02:03

## 2024-03-12 RX ADMIN — ROCURONIUM BROMIDE 50 MG: 50 INJECTION, SOLUTION INTRAVENOUS at 01:08

## 2024-03-12 RX ADMIN — FENTANYL CITRATE 50 MCG: 50 INJECTION, SOLUTION INTRAMUSCULAR; INTRAVENOUS at 01:31

## 2024-03-12 RX ADMIN — POLYETHYLENE GLYCOL 3350 1 PACKET: 17 POWDER, FOR SOLUTION ORAL at 06:15

## 2024-03-12 RX ADMIN — OXYCODONE HYDROCHLORIDE 5 MG: 5 SOLUTION ORAL at 02:02

## 2024-03-12 RX ADMIN — DEXAMETHASONE SODIUM PHOSPHATE 8 MG: 4 INJECTION INTRA-ARTICULAR; INTRALESIONAL; INTRAMUSCULAR; INTRAVENOUS; SOFT TISSUE at 01:08

## 2024-03-12 RX ADMIN — KETOROLAC TROMETHAMINE 15 MG: 15 INJECTION, SOLUTION INTRAMUSCULAR; INTRAVENOUS at 01:31

## 2024-03-12 RX ADMIN — ALBUTEROL SULFATE 2.5 MG: 2.5 SOLUTION RESPIRATORY (INHALATION) at 01:55

## 2024-03-12 RX ADMIN — ACETAMINOPHEN 1000 MG: 500 TABLET, FILM COATED ORAL at 03:57

## 2024-03-12 RX ADMIN — ONDANSETRON 4 MG: 2 INJECTION INTRAMUSCULAR; INTRAVENOUS at 01:08

## 2024-03-12 ASSESSMENT — LIFESTYLE VARIABLES
TOTAL SCORE: 0
HAVE PEOPLE ANNOYED YOU BY CRITICIZING YOUR DRINKING: NO
ON A TYPICAL DAY WHEN YOU DRINK ALCOHOL HOW MANY DRINKS DO YOU HAVE: 0
DOES PATIENT WANT TO STOP DRINKING: NO
TOTAL SCORE: 0
EVER HAD A DRINK FIRST THING IN THE MORNING TO STEADY YOUR NERVES TO GET RID OF A HANGOVER: NO
AVERAGE NUMBER OF DAYS PER WEEK YOU HAVE A DRINK CONTAINING ALCOHOL: 0
ALCOHOL_USE: NO
HAVE YOU EVER FELT YOU SHOULD CUT DOWN ON YOUR DRINKING: NO
EVER FELT BAD OR GUILTY ABOUT YOUR DRINKING: NO
CONSUMPTION TOTAL: NEGATIVE
HOW MANY TIMES IN THE PAST YEAR HAVE YOU HAD 5 OR MORE DRINKS IN A DAY: 0
TOTAL SCORE: 0

## 2024-03-12 ASSESSMENT — COGNITIVE AND FUNCTIONAL STATUS - GENERAL
SUGGESTED CMS G CODE MODIFIER DAILY ACTIVITY: CJ
SUGGESTED CMS G CODE MODIFIER MOBILITY: CI
DAILY ACTIVITIY SCORE: 22
MOVING TO AND FROM BED TO CHAIR: A LITTLE
MOBILITY SCORE: 23
DRESSING REGULAR LOWER BODY CLOTHING: A LITTLE
TOILETING: A LITTLE

## 2024-03-12 ASSESSMENT — PAIN DESCRIPTION - PAIN TYPE
TYPE: SURGICAL PAIN
TYPE: ACUTE PAIN
TYPE: SURGICAL PAIN
TYPE: SURGICAL PAIN

## 2024-03-12 ASSESSMENT — COPD QUESTIONNAIRES
DO YOU EVER COUGH UP ANY MUCUS OR PHLEGM?: NO/ONLY WITH OCCASIONAL COLDS OR INFECTIONS
DURING THE PAST 4 WEEKS HOW MUCH DID YOU FEEL SHORT OF BREATH: NONE/LITTLE OF THE TIME
HAVE YOU SMOKED AT LEAST 100 CIGARETTES IN YOUR ENTIRE LIFE: NO/DON'T KNOW
COPD SCREENING SCORE: 0

## 2024-03-12 ASSESSMENT — PATIENT HEALTH QUESTIONNAIRE - PHQ9
1. LITTLE INTEREST OR PLEASURE IN DOING THINGS: NOT AT ALL
SUM OF ALL RESPONSES TO PHQ9 QUESTIONS 1 AND 2: 0
2. FEELING DOWN, DEPRESSED, IRRITABLE, OR HOPELESS: NOT AT ALL

## 2024-03-12 ASSESSMENT — PAIN SCALES - GENERAL: PAIN_LEVEL: 6

## 2024-03-12 NOTE — OR SURGEON
Immediate Post OP Note    PreOp Diagnosis: Acute appendicitis      PostOp Diagnosis: Acute appendicitis      Procedure(s):  APPENDECTOMY, LAPAROSCOPIC - Wound Class: Clean Contaminated    Surgeon(s):  Ivan Caceres M.D.    Anesthesiologist/Type of Anesthesia:  Anesthesiologist: Tobey Gansert, M.D./General    Surgical Staff:  Circulator: Dc Parker R.N.  Scrub Person: Enrike Edwards    Specimens removed if any:  ID Type Source Tests Collected by Time Destination   A : APPENDIX Tissue Appendix PATHOLOGY SPECIMEN Ivan Caceres M.D. 3/12/2024  1:23 AM        Estimated Blood Loss: 25    Findings: Inflammation the appendix    Complications: None        3/12/2024 1:37 AM Ivan Caceres M.D.

## 2024-03-12 NOTE — ANESTHESIA PROCEDURE NOTES
Airway    Date/Time: 3/12/2024 1:08 AM    Performed by: Tobey Gansert, M.D.  Authorized by: Tobey Gansert, M.D.    Location:  OR  Urgency:  Elective  Indications for Airway Management:  Anesthesia      Spontaneous Ventilation: absent    Sedation Level:  Deep  Preoxygenated: Yes    Patient Position:  Sniffing  Final Airway Type:  Endotracheal airway  Final Endotracheal Airway:  ETT  Cuffed: Yes    Technique Used for Successful ETT Placement:  Direct laryngoscopy    Insertion Site:  Oral  Blade Type:  Michael  Laryngoscope Blade/Videolaryngoscope Blade Size:  3  ETT Size (mm):  7.0  Measured from:  Teeth  ETT to Teeth (cm):  22  Placement Verified by: auscultation and capnometry    Cormack-Lehane Classification:  Grade IIa - partial view of glottis  Number of Attempts at Approach:  1

## 2024-03-12 NOTE — ANESTHESIA TIME REPORT
Anesthesia Start and Stop Event Times       Date Time Event    3/12/2024 0051 Ready for Procedure     0101 Anesthesia Start     0145 Anesthesia Stop          Responsible Staff  03/12/24      Name Role Begin End    Tobey Gansert, M.D. Anesth 0101 0145          Overtime Reason:  no overtime (within assigned shift)    Comments:

## 2024-03-12 NOTE — DISCHARGE SUMMARY
ED Observation Discharge Summary    Patient:Tavia Dong  Patient : 1990  Patient MRN: [id number]  Patient PCP: Pcp Pt States None    Admit Date: 3/11/2024  Discharge Date and Time: 24 11:51 PM  Discharge Diagnosis: Acute appendicitis  Discharge Attending: Estefania Fisher D.O.  Discharge Service: ED Observation    ED Course  Tavia is a 34 y.o. female who was evaluated at [facility] for right-sided abdominal pain.  Leukocytosis with WBC 14.9, leftward shift.  CT with mild prominence of the appendix cannot exclude early appendicitis.  Previous cholecystectomy.  Significant reproducible discomfort.  Initially evaluated by surgery as requested by patient was undecided on proceeding.  Rocephin, Flagyl added, anticipated discharge home with Augmentin, however patient has now decided that she would like to proceed with surgery.    11:52 PM Dr. Caceres is again aware of the patient and agreeable to consultation.    Discharge Exam:  /66   Pulse 91   Temp 36.4 °C (97.5 °F) (Temporal)   Resp 20   Ht 1.524 m (5')   Wt 82.6 kg (182 lb 1.6 oz)   SpO2 95%   BMI 35.56 kg/m² .    Constitutional: Awake and alert. Nontoxic  HENT:  Grossly normal  Eyes: Grossly normal  Neck: Normal range of motion  Cardiovascular: Normal peripheral perfusion  Thorax & Lungs: Nonlabored respirations  Abdomen: Tenderness, guarding right mid lower quadrant without peritonitis  Skin:  No pathologic rash.   Extremities: Well perfused  Psychiatric: Affect normal    Labs  Results for orders placed or performed during the hospital encounter of 24   CBC WITH DIFFERENTIAL   Result Value Ref Range    WBC 14.9 (H) 4.8 - 10.8 K/uL    RBC 5.16 4.20 - 5.40 M/uL    Hemoglobin 14.7 12.0 - 16.0 g/dL    Hematocrit 43.4 37.0 - 47.0 %    MCV 84.1 81.4 - 97.8 fL    MCH 28.5 27.0 - 33.0 pg    MCHC 33.9 32.2 - 35.5 g/dL    RDW 41.2 35.9 - 50.0 fL    Platelet Count 410 164 - 446 K/uL    MPV 9.2 9.0 - 12.9 fL    Neutrophils-Polys 85.60 (H)  44.00 - 72.00 %    Lymphocytes 10.70 (L) 22.00 - 41.00 %    Monocytes 2.70 0.00 - 13.40 %    Eosinophils 0.30 0.00 - 6.90 %    Basophils 0.30 0.00 - 1.80 %    Immature Granulocytes 0.40 0.00 - 0.90 %    Nucleated RBC 0.00 0.00 - 0.20 /100 WBC    Neutrophils (Absolute) 12.78 (H) 1.82 - 7.42 K/uL    Lymphs (Absolute) 1.60 1.00 - 4.80 K/uL    Monos (Absolute) 0.40 0.00 - 0.85 K/uL    Eos (Absolute) 0.04 0.00 - 0.51 K/uL    Baso (Absolute) 0.05 0.00 - 0.12 K/uL    Immature Granulocytes (abs) 0.06 0.00 - 0.11 K/uL    NRBC (Absolute) 0.00 K/uL   COMP METABOLIC PANEL   Result Value Ref Range    Sodium 139 135 - 145 mmol/L    Potassium 3.2 (L) 3.6 - 5.5 mmol/L    Chloride 103 96 - 112 mmol/L    Co2 23 20 - 33 mmol/L    Anion Gap 13.0 7.0 - 16.0    Glucose 110 (H) 65 - 99 mg/dL    Bun 15 8 - 22 mg/dL    Creatinine 0.74 0.50 - 1.40 mg/dL    Calcium 9.1 8.5 - 10.5 mg/dL    Correct Calcium 8.3 (L) 8.5 - 10.5 mg/dL    AST(SGOT) 19 12 - 45 U/L    ALT(SGPT) 28 2 - 50 U/L    Alkaline Phosphatase 128 (H) 30 - 99 U/L    Total Bilirubin 0.5 0.1 - 1.5 mg/dL    Albumin 5.0 (H) 3.2 - 4.9 g/dL    Total Protein 8.2 6.0 - 8.2 g/dL    Globulin 3.2 1.9 - 3.5 g/dL    A-G Ratio 1.6 g/dL   LIPASE   Result Value Ref Range    Lipase 18 11 - 82 U/L   URINALYSIS,CULTURE IF INDICATED    Specimen: Urine   Result Value Ref Range    Color Yellow     Character Cloudy (A)     Specific Gravity 1.034 <1.035    Ph 5.5 5.0 - 8.0    Glucose Negative Negative mg/dL    Ketones Negative Negative mg/dL    Protein 30 (A) Negative mg/dL    Bilirubin Negative Negative    Urobilinogen, Urine 0.2 Negative    Nitrite Negative Negative    Leukocyte Esterase Small (A) Negative    Occult Blood Trace (A) Negative    Micro Urine Req Microscopic    HCG QUAL SERUM   Result Value Ref Range    Beta-Hcg Qualitative Serum Negative Negative   URINE MICROSCOPIC (W/UA)   Result Value Ref Range    WBC 5-10 (A) /hpf    RBC 0-2 /hpf    Bacteria Few (A) None /hpf    Epithelial Cells Many  (A) /hpf    Hyaline Cast 0-2 /lpf   ESTIMATED GFR   Result Value Ref Range    GFR (CKD-EPI) 107 >60 mL/min/1.73 m 2       Radiology  CT-ABDOMEN-PELVIS WITH   Final Result         1.  Mild prominence of the appendix which appears otherwise unremarkable. Radiographic appearance is indeterminate and cannot exclude early appendicitis.   2.  Common bile duct dilatation, commonly associated with postcholecystectomy physiology, consider causes of biliary obstruction with additional workup as clinically appropriate.          Medications:   New Prescriptions    AMOXICILLIN-CLAVULANATE (AUGMENTIN) 875-125 MG TAB    Take 1 Tablet by mouth 2 times a day for 7 days.    HYDROCODONE-ACETAMINOPHEN (NORCO) 5-325 MG TAB PER TABLET    Take 1 Tablet by mouth every 6 hours as needed (pain) for up to 2 days.       My final assessment includes acute appendicitis, ongoing abdominal pain, will be hospitalized for surgical consultation and anticipated appendectomy.    Upon Reevaluation, the patient's condition has: not improved; and will be escalated to hospitalization.    Patient discharged from ED Observation status at 11:53 PM  (Time) 3/11/24 (Date).     Total time spent on this ED Observation discharge encounter is > 30 Minutes    Electronically signed by: Estefania Fisher D.O., 3/11/2024 11:51 PM

## 2024-03-12 NOTE — ED NOTES
Pt ambulatory to restroom and back to room independently with steady gait  Connected to monitor, call light is in reach

## 2024-03-12 NOTE — ED NOTES
Assumed care of patient. Pt resting in bed. MD at bedside to discuss with patient. Pt would like to proceed with surgery

## 2024-03-12 NOTE — PROGRESS NOTES
Patient tolerating diet, ambulating on room air with no assistance. DC lounge orders placed with active DC orders

## 2024-03-12 NOTE — OP REPORT
DATE OF OPERATION:   3/12/2024     PREOPERATIVE DIAGNOSIS: Acute appendicitis.    POSTOPERATIVE DIAGNOSIS: Acute appendicitis.     PROCEDURE PERFORMED: Laparoscopic appendectomy     SURGEON:    Ivan Caceres M.D.      ANESTHESIOLOGIST:  Tobey B. Gansert, M.D.     ANESTHESIA:   General endotracheal anesthesia.        INDICATIONS: The patient is a 34 year-old woman with clinical and radiographic findings of acute appendicitis. She is taken to the operating room for laparoscopic appendectomy.         FINDINGS: The appendix was inflamed not ruptured.    WOUND CLASSIFICATION:  Class II, Clean Contaminated.    SPECIMEN:     Appendix.    ESTIMATED BLOOD LOSS:   25 mL.     PROCEDURE: Following informed consent consent, the patient was properly identified, taken to the operating room and placed in supine position where general endotracheal anesthesia was administered. Intravenous antibiotics were administered by the anesthesiologist in correct time interval. The patient voided prior to surgery. A urinary catheter was not placed. Sequential compression devices were employed. The abdomen was prepped and draped into a sterile field. A timeout was conducted with the full attention and participation of all operating room personnel.    Marcaine  with epinephrine was used to infiltrate the port sites. An infraumbilical midline incision was made and subcutaneous tissue spread bluntly. The fascia was elevated and incised.  Rebollar port was placed.. Carbon dioxide pneumoperitoneum was instilled.     A  30 degree lens and camera was introduced. A 5 mm port was placed in left lower quadrant under direct vision. A 5 mm port was placed in suprapubic midline under direct vision. The appendix was identified and elevated. The filmy adhesions were taken down bluntly. The appendiceal mesentery was then taken down  care using harmonic .The appendix was divided at its base with a single firing of an Endo-ROSE MARIE stapler with a White  Vascular/Thin load.      The appendix was placed within a laparoscopic specimen retrieval bag and delivered intact from the abdominal cavity and submitted for permanent pathology. The resection site was inspected. Hemostasis was satisfactory.    The abdomen was desufflated and the ports were removed. The umbilical port site fascia was approximated using a trocar site closure device with a 0 VICRYL® Plus Antibacterial suture. The port site skin incisions were closed with interrupted 4-0 MONOCRYL® subcuticular sutures. A DERMABOND ADVANCED® Topical Skin Adhesive dressing was applied.    The patient tolerated the procedure well, and there were no apparent complications. All sponge, needle, and instrument counts were correct on 2 separate occasions. The patient was awakened, extubated, and transferred to  to the post anesthesia care unit (PACU) in satisfactory condition.       ____________________________________     Ivan Caceres M.D.    DD: 3/12/2024  1:38 AM

## 2024-03-12 NOTE — ANESTHESIA PREPROCEDURE EVALUATION
Case: [id number] Date/Time: 03/12/24 0030    Procedure: APPENDECTOMY, LAPAROSCOPIC    Location: [facility] OR 09 / SURGERY [facility]    Surgeons: Ivan Caceres M.D.            Relevant Problems   No relevant active problems       Physical Exam    Airway   Mallampati: II  TM distance: >3 FB  Neck ROM: full       Cardiovascular - normal exam  Rhythm: regular  Rate: normal  (-) murmur     Dental - normal exam           Pulmonary - normal exam  Breath sounds clear to auscultation     Abdominal    Neurological - normal exam                   Anesthesia Plan    ASA 1- EMERGENT   ASA physical status emergent criteria: acutely contaminated wound or identified infection source    Plan - general       Airway plan will be ETT          Induction: intravenous    Postoperative Plan: Postoperative administration of opioids is intended.    Pertinent diagnostic labs and testing reviewed    Informed Consent:    Anesthetic plan and risks discussed with patient.    Use of blood products discussed with: patient whom consented to blood products.

## 2024-03-12 NOTE — ED TRIAGE NOTES
Chief Complaint   Patient presents with    Abdominal Pain     Right upper quadrant pain going down to lower abdomen started today. Pt also reported being nauseated and dizzy.      Pt ambulate to the triage room, AAOx4, GCS 15. Urine specimen cup provided to patient.

## 2024-03-12 NOTE — PROGRESS NOTES
4 Eyes Skin Assessment Completed by Lauren RN and Tanvi RN.    Head WDL  Ears Redness  Nose WDL  Mouth WDL  Neck WDL  Breast/Chest WDL  Shoulder Blades WDL  Spine WDL  (R) Arm/Elbow/Hand WDL  (L) Arm/Elbow/Hand WDL  Abdomen Incision x3 lap sites  Groin WDL  Scrotum/Coccyx/Buttocks Blanching  (R) Leg WDL  (L) Leg WDL  (R) Heel/Foot/Toe WDL  (L) Heel/Foot/Toe WDL          Devices In Places Blood Pressure Cuff and Pulse Ox      Interventions In Place Gray Ear Foams    Possible Skin Injury No    Pictures Uploaded Into Epic N/A  Wound Consult Placed N/A  RN Wound Prevention Protocol Ordered No

## 2024-03-12 NOTE — PROGRESS NOTES
Report received from RN, assumed care at 0645  Pt is A0X4, and responds appropriately   Pt declines any SOB, chest pain, new onset of numbness/ tingling  Reports mild RLQ pain, tolerating with ice packs  Pt is voiding adequately and without hesitancy  Pt has + flatus, + bowel sounds, LBM PTA  Pt ambulates independently   Pt is tolerating a diet, pt denies any nausea/vomiting  On room air, IVF DC'd   Plan of care discussed, all questions answered. Explained importance of calling before getting OOB and pt verbalizes understanding. Explained importance of oral care. Call light is within reach, treaded slipper socks on, bed in lowest/ locked position, hourly rounding in place, all needs met at this time

## 2024-03-12 NOTE — ANESTHESIA POSTPROCEDURE EVALUATION
Patient: Tavia Dong    Procedure Summary       Date: 03/12/24 Room / Location: [facility] 09 / SURGERY [facility]    Anesthesia Start: 0101 Anesthesia Stop: 0145    Procedure: APPENDECTOMY, LAPAROSCOPIC (Abdomen) Diagnosis: (ACUTE APPENDICITIS)    Surgeons: Ivan Caceres M.D. Responsible Provider: Tobey Gansert, M.D.    Anesthesia Type: general ASA Status: 1 - Emergent            Final Anesthesia Type: general  Last vitals  BP   Blood Pressure: 127/76    Temp   36.7 °C (98.1 °F)    Pulse   94   Resp   20    SpO2   95 %      Anesthesia Post Evaluation    Patient location during evaluation: PACU  Patient participation: complete - patient participated  Level of consciousness: awake and alert  Pain score: 6    Airway patency: patent  Anesthetic complications: no  Cardiovascular status: hemodynamically stable  Respiratory status: acceptable  Hydration status: euvolemic    PONV: none          There were no known notable events for this encounter.

## 2024-03-12 NOTE — OR NURSING
Patient recovered well in post-op. VSS, on 2 L O2 NC. 3 surgical sites closed with dermabond; all sites clean, dry and intact. Surgical pain managed through po and IV medications. Patient tolerating fluids without nausea. Pt contact did not answer for update. Patient belongings retrieved and on [place]. Report called to Lauren. Awaiting transport.

## 2024-03-12 NOTE — DISCHARGE INSTRUCTIONS
Post Operative Discharge Instructions:    1. DIET: Upon discharge from the hospital, you may resume your normal preoperative diet, unless specifically directed otherwise. Depending on how you are feeling and whether you have nausea or not, you may wish to stay with a bland diet for the first few days. However, you can advance this as quickly as you feel ready.    2. ACTIVITIES: After discharge from the hospital, you may resume full routine activities; however, there should be no heavy lifting (greater than 20 pounds or a bag of groceries) and no strenuous activities for at least 2 weeks. The duration may be longer, depending on your surgical procedure. Routine activities of daily living are acceptable. Activity level should be addressed at your post-op follow up appointment.    3. DRIVING: You may drive whenever you are off pain medications and are able to perform the activities needed to drive, i.e., turning, bending, twisting, etc.    4. BATHING: You may get the wound wet at any time after leaving the hospital. You may shower, but do not submerge in a bath for at least two weeks.  If you have wound dressings, they may come off after 48 hours.  If you have skin glue to the wound, this will fall off on its own, do not pick at it.  If you have Steri-Strips to the wound, these will fall off on their own, do not pick at them. You may trim the edges if needed.    5. BOWEL FUNCTION:   After surgery, it is not uncommon for patients to develop either frequent or loose stools after meals. This usually corrects itself after a few days, to a few weeks. If this occurs, do not worry; this will resolve on its own.  Constipation is much more common than loose stools. The cause is the combination of pain medication and decreased activity level and possibly the nature of the surgical procedure performed. If you feel this is occurring, you may use an over-the-counter treatment such as MiraLAX (or Milk of Magnesia, Ex-Lax,  Senokot, etc.) until the problem has resolved. Drink plenty of water and try to wean off narcotic pain medications as soon as is comfortable for you.    6. PAIN MEDICATION:   You will be given a prescription for pain medication at discharge. Please take these as directed. It is important to remember not to take medications on an empty stomach as this may cause nausea.  You may also take over the counter acetaminophen and/or NSAIDS (ibuprofen, Aleve, Advil, Motrin) per the package instructions.  You may also use ice to the wound to decrease pain and swelling. You may alternate 20 minutes on and 20 minutes off with the ice for the first 24-48 hours. Make sure you place a washcloth or towel between the ice pack and your skin.  Please note that narcotic pain medication cannot be refilled unless you are seen by a doctor. Make sure you call the office if you are running low on medication or if the dose you have been prescribed is not working well for you.    7.CALL THE [place] [facility] AT [phone] IF YOU HAVE:  (1) Fevers to more than 101F, (2) Unusual chest or leg pain, (3) Drainage or fluid from incision that may be foul smelling, increased tenderness or soreness at the wound or the wound edges are no longer together, redness or swelling at the incision site. Do not hesitate to call with any other questions.

## 2024-03-12 NOTE — PROGRESS NOTES
Pt arrived to unit  AAOx4  2L NC  All belongings at bedside    Skin:  3 lap sites with dermabond. CDI    POC reviewed, education provided  Call light within reach

## 2024-03-12 NOTE — ED PROVIDER NOTES
ED Provider Note    CHIEF COMPLAINT  Chief Complaint   Patient presents with    Abdominal Pain     Right upper quadrant pain going down to lower abdomen started today. Pt also reported being nauseated and dizzy.        EXTERNAL RECORDS REVIEWED  Inpatient Notes patient has a history of cholecystectomy in 2013    HPI/ROS  LIMITATION TO HISTORY   Select: : None  OUTSIDE HISTORIAN(S):  ami Dong is a 34 y.o. female who presents to the emerged part with chief complaint of right lower abdominal pain that radiates up in the abdomen on the right side.  She states it started at 11 AM this morning with a mild dull ache she thought maybe she was hungry when she tried to eat it just made her pain worse.  She states the pain is in her right lower abdomen radiating up to the right upper abdomen epigastric area.  Its become progressively worsened throughout the day and extremely uncomfortable.  She states she is nauseated and feels lightheaded.  No dysuria no hematuria no flank pain.  She denies diarrhea or constipation her last bowel movement this morning which was normal.  She just feels extremely uncomfortable.  Does have a history of cholecystectomy.    PAST MEDICAL HISTORY   has a past medical history of GERD (gastroesophageal reflux disease) and Headache(784.0).    SURGICAL HISTORY   has a past surgical history that includes cholecystectomy (2013).    FAMILY HISTORY  Family History   Problem Relation Age of Onset    Arthritis Mother     Alcohol/Drug Father         Drug & ETOH abuse    Hypertension Paternal Grandmother     Stroke Maternal Grandmother         X 1    Heart Disease Maternal Grandmother         MI    Hypertension Maternal Grandmother        SOCIAL HISTORY  Social History     Tobacco Use    Smoking status: Never    Smokeless tobacco: Never   Substance and Sexual Activity    Alcohol use: No    Drug use: No    Sexual activity: Yes     Partners: Male     Comment: none       CURRENT MEDICATIONS  Home  Medications       Reviewed by Nicolle Werner R.N. (Registered Nurse) on 03/11/24 at 2050  Med List Status: Not Addressed     Medication Last Dose Status   amitriptyline (ELAVIL) 25 MG Tab  Active   baclofen (LIORESAL) 10 MG Tab  Active   benzonatate (TESSALON) 100 MG Cap  Active   hydrOXYzine HCl (ATARAX) 25 MG Tab  Active   ketorolac (TORADOL) 10 MG Tab  Active   methylPREDNISolone (MEDROL DOSEPAK) 4 MG Tablet Therapy Pack  Active   sertraline (ZOLOFT) 25 MG tablet  Active                    ALLERGIES  No Known Allergies    PHYSICAL EXAM  VITAL SIGNS: /88   Pulse 92   Temp 36.4 °C (97.5 °F) (Temporal)   Resp 16   Ht 1.524 m (5')   Wt 82.6 kg (182 lb 1.6 oz)   SpO2 98%   BMI 35.56 kg/m²    Pulse OX: Pulse Oxygen level is within normal limits  Constitutional: Alert in moderate distress quite uncomfortable appearing  HENT: Normocephalic, Atraumatic, MMM  Eyes: PERound. Conjunctiva normal, non-icteric.   Heart: Regular rate and rhythm, intact distal pulses   Lungs: Symmetrical movement, no resp distress   Abdomen: Tenderness to right lower quadrant with localized guarding and tenderness to the right upper abdomen as well with mild guarding no rebound non-distended, normal bowel sounds  EXT/Back no edema  Skin: Warm, Dry, No erythema, No rash.   Neurologic: Alert and oriented, Grossly non-focal.       DIAGNOSTIC STUDIES / PROCEDURES      LABS  Labs Reviewed   CBC WITH DIFFERENTIAL - Abnormal; Notable for the following components:       Result Value    WBC 14.9 (*)     Neutrophils-Polys 85.60 (*)     Lymphocytes 10.70 (*)     Neutrophils (Absolute) 12.78 (*)     All other components within normal limits   COMP METABOLIC PANEL - Abnormal; Notable for the following components:    Potassium 3.2 (*)     Glucose 110 (*)     Correct Calcium 8.3 (*)     Alkaline Phosphatase 128 (*)     Albumin 5.0 (*)     All other components within normal limits   URINALYSIS,CULTURE IF INDICATED - Abnormal; Notable for the  following components:    Character Cloudy (*)     Protein 30 (*)     Leukocyte Esterase Small (*)     Occult Blood Trace (*)     All other components within normal limits    Narrative:     Indication for culture:->Patient WITHOUT an indwelling Kim  catheter in place with new onset of Dysuria, Frequency,  Urgency, and/or Suprapubic pain   URINE MICROSCOPIC (W/UA) - Abnormal; Notable for the following components:    WBC 5-10 (*)     Bacteria Few (*)     Epithelial Cells Many (*)     All other components within normal limits    Narrative:     Indication for culture:->Patient WITHOUT an indwelling Kim  catheter in place with new onset of Dysuria, Frequency,  Urgency, and/or Suprapubic pain   LIPASE   HCG QUAL SERUM   ESTIMATED GFR         RADIOLOGY  I have independently interpreted the diagnostic imaging associated with this visit and am waiting the final reading from the radiologist.   My preliminary interpretation is as follows:     CT abd pelvis - appendix does appear enlarged without free fluid or free air     Radiologist interpretation:     CT-ABDOMEN-PELVIS WITH   Final Result         1.  Mild prominence of the appendix which appears otherwise unremarkable. Radiographic appearance is indeterminate and cannot exclude early appendicitis.   2.  Common bile duct dilatation, commonly associated with postcholecystectomy physiology, consider causes of biliary obstruction with additional workup as clinically appropriate.            COURSE & MEDICAL DECISION MAKING    ED Observation Status? No; Patient does not meet criteria for ED Observation.     INITIAL ASSESSMENT, COURSE AND PLAN  Care Narrative:     Patient is a 34-year-old female presents emerged part with acute onset right-sided abdominal pain.  Was mildly achy in nature progressed now is very uncomfortable.  She cannot straighten her right lower extremity secondary to discomfort.  The way the pain radiates into her upper abdomen be atypical but primary concern  is appendicitis diverticulitis colitis.  Atypical renal stone is also on the differential pyelonephritis or possibly ovarian pathology.  She will be treated with IV pain management IV antibiotics laboratory analysis and CT of the ab pelvis was ordered.    DISPOSITION AND DISCUSSIONS    11:00 PM  Patient CT scan resulted consider for concern for early appendicitis and based on my physical examination this is most likely I repeated my evaluation despite morphine and Zofran she still exquisitely tender in the right lower quadrant having pain with movement of the right leg consistent with an psoas sign.  She be treated with IV antibiotics discussed case with general surgery on-call. - rocephin and flagyl ordered     11:06 PM  Spoke w Dr caceres who will come to the see the patient at the bedside     11:29 PM  Dr Caceres came to the bedside to evaluate the patient.  She is unsure if she wants surgery because her pain is atypical and may be an early appendicitis on CT.  She is just beginning to receive antibiotics at this time.  We discussed the risk benefits she feels comfortable receiving antibiotics at this time and after they have completed she is going to talk with her mom and decide is whether or not she wants to do antibiotics at home or just to have the surgery today.  She was signed out to my partner Dr. Khan to follow-up the patient's decision I will write her for Augmentin and pain management at home with strict return precautions next 12 to 24 hours.    I have discussed management of the patient with the following physicians and RADHA's:  Mor[place]    Discussion of management with other [facility] or appropriate source(s): Pharmacy for antibx      In prescribing controlled substances to this patient, I certify that I have obtained and reviewed the medical history of Tavia Dong. I have also made a good linh effort to obtain applicable records from other providers who have treated the patient and records did not  demonstrate any increased risk of substance abuse that would prevent me from prescribing controlled substances.     I have conducted a physical exam and documented it. I have reviewed Ms. Coronel’s prescription history as maintained by the [place] Prescription Monitoring Program.     I have assessed the patient’s risk for abuse, dependency, and addiction using the validated Opioid Risk Tool available at https://www.BetterYou.com/gboyvt-rpme-gole-ort-narcotic-abuse. 0    Given the above, I believe the benefits of controlled substance therapy outweigh the risks. The reasons for prescribing controlled substances include non-narcotic, oral analgesic alternatives have been inadequate for pain control. Accordingly, I have discussed the risk and benefits, treatment plan, and alternative therapies with the patient.       FINAL DIAGNOSIS  1. Acute appendicitis with localized peritonitis, without perforation or abscess, unspecified whether gangrene present           Electronically signed by: Estefania Celaya M.D., 3/11/2024 9:04 PM

## 2024-03-12 NOTE — ASSESSMENT & PLAN NOTE
Right lower quadrant abdominal pain  CT scan indeterminate  Elevated white count  3/12 Laparoscopic appendectomy  ACS Blue

## 2024-03-12 NOTE — H&P
CHIEF COMPLAINT: Right lower quadrant pain.     HISTORY OF PRESENT ILLNESS: Tavia Dong is a very pleasant 34-year-old female seen by the emergency department plaint of right lower quadrant pain.  She reports pain started earlier today.  States pain became progressively worse localized right lower quadrant.  She reports associated nausea no vomiting.  She states no diarrhea.  She states no fever.   She states no headache no sore throat.  She states no chest pain no difficulty breathing.  She states no extremity pain no new rash.   Past surgical history cholecystectomy.       PAST MEDICAL HISTORY:  has a past medical history of GERD (gastroesophageal reflux disease) and Headache(784.0).    She has no past medical history of Addisons disease (HCC), Adrenal disorder (HCC), Allergy, unspecified not elsewhere classified, Anemia, Anxiety, Arrhythmia, Arthritis, Asthma, Asymptomatic human immunodeficiency virus (HIV) infection status (HCC), Cancer (HCC), CHF (congestive heart failure) ([facility]), Chronic airway obstruction, not elsewhere classified, Clotting disorder (HCC), Cushings syndrome (HCC), Depression, Diabetic neuropathy (HCC), Emphysema, Glaucoma, Goiter, Headache, classical migraine, Heart attack (HCC), Heart murmur, Hyperlipidemia, Hypertension, IBD (inflammatory bowel disease), Kidney disease, Meningitis, Osteoporosis, unspecified, Parathyroid disorder (HCC), Pituitary disease (HCC), Seizure (HCC), Sickle cell disease (HCC), Stroke (HCC), Substance abuse (HCC), Thyroid disease, Tuberculosis, Type II or unspecified type diabetes mellitus without mention of complication, not stated as uncontrolled, Ulcer, Unspecified cataract, or Urinary tract infection, site not specified.    PAST SURGICAL HISTORY:  has a past surgical history that includes cholecystectomy (2013).    ALLERGIES: No Known Allergies    CURRENT MEDICATIONS:    Home Medications       Reviewed by Nicolle Werner R.N. (Registered Nurse) on  03/11/24 at 2050  Med List Status: Not Addressed     Medication Last Dose Status   amitriptyline (ELAVIL) 25 MG Tab  Active   baclofen (LIORESAL) 10 MG Tab  Active   benzonatate (TESSALON) 100 MG Cap  Active   hydrOXYzine HCl (ATARAX) 25 MG Tab  Active   ketorolac (TORADOL) 10 MG Tab  Active   methylPREDNISolone (MEDROL DOSEPAK) 4 MG Tablet Therapy Pack  Active   sertraline (ZOLOFT) 25 MG tablet  Active                    FAMILY HISTORY: family history includes Alcohol/Drug in her father; Arthritis in her mother; Heart Disease in her maternal grandmother; Hypertension in her maternal grandmother and paternal grandmother; Stroke in her maternal grandmother.    SOCIAL HISTORY:  reports that she has never smoked. She has never used smokeless tobacco. She reports that she does not drink alcohol and does not use drugs.    REVIEW OF SYSTEMS: Comprehensive review of systems is negative with the exception of the aforementioned HPI, PMH, and PSH bullets in accordance with CMS guidelines.    PHYSICAL EXAMINATION:      Constitutional:     Vital Signs: /75   Pulse 92   Temp 36.4 °C (97.5 °F) (Temporal)   Resp 20   Ht 1.524 m (5')   Wt 82.6 kg (182 lb 1.6 oz)   SpO2 95%    General Appearance: alert in no acute distress.   HEENT:    No facial tenderness no discharge  Neck:    Supple. .  Respiratory:   Breathing with ease no cough no distress  Cardiovascular:   Regular rate skin warm brisk capillary refill palpable pulse  Abdomen:  Soft nondistended right-sided tenderness no tenderness left no guarding no rebound  Extremities:   Examination of the upper and lower extremities demonstrates no cyanosis edema or clubbing.  Neurologic:   Alert & oriented to person, time and place.   LABORATORY VALUES:   Recent Labs     03/11/24 2111   WBC 14.9*   RBC 5.16   HEMOGLOBIN 14.7   HEMATOCRIT 43.4   MCV 84.1   MCH 28.5   MCHC 33.9   RDW 41.2   PLATELETCT 410   MPV 9.2     Recent Labs     03/11/24 2111   SODIUM 139   POTASSIUM  3.2*   CHLORIDE 103   CO2 23   GLUCOSE 110*   BUN 15   CREATININE 0.74   CALCIUM 9.1     Recent Labs     03/11/24  2111   ASTSGOT 19   ALTSGPT 28   TBILIRUBIN 0.5   ALKPHOSPHAT 128*   GLOBULIN 3.2            IMAGING:   CT-ABDOMEN-PELVIS WITH   Final Result         1.  Mild prominence of the appendix which appears otherwise unremarkable. Radiographic appearance is indeterminate and cannot exclude early appendicitis.   2.  Common bile duct dilatation, commonly associated with postcholecystectomy physiology, consider causes of biliary obstruction with additional workup as clinically appropriate.          ASSESSMENT AND PLAN:   Appendicitis  Right lower quadrant abdominal pain  CT scan indeterminate  Elevated white count    Discussed findings with Tavia Dong  Discussed indeterminate CT scan as above  Discussed options including observation, appendectomy  She demonstrated understanding wish to proceed with appendectomy    The patient will be taken to the operating room for laparoscopic appendectomy.  The surgical conduct was discussed in detail. Potential complications including, but not limited to, missed diagnosis nontherapeutic operation, inadequate appendectomy possible for current appendicitis, leakage from the staple line abscess requiring drainage infection, bleeding requiring transfusion, poor cosmesis damage to adjacent structures, need to convert to an open procedure, and anesthetic complications were discussed.   Discussed need for limited activity no strenuous activity no lifting greater than 20 pounds for 1 month to reduce risk of complications after surgery    All questions were answered and are discussed the patient satisfaction  Informed consent obtained     ____________________________________     Ivan Caceres M.D.    DD: 3/12/2024  12:14 AM    AAST Grading System for EGS Conditions

## 2024-03-12 NOTE — PROGRESS NOTES
DATE: 3/12/2024    Post Operative Day  1 laparoscopic appendectomy.    INTERVAL EVENTS:  Potassium low - replaced   Tolerating fluids  Adequate pain control     PHYSICAL EXAMINATION:  Vital Signs: /75   Pulse 84   Temp 36.5 °C (97.7 °F) (Temporal)   Resp 18   Ht 1.524 m (5')   Wt 82.6 kg (182 lb 1.6 oz)   SpO2 91%     A&O x 4  Respiratory rate even and unlabored  Abd soft  Incision clear with glue  Minimal expected tenderness    LABORATORY VALUES:  Recent Labs     03/11/24 2111   WBC 14.9*   RBC 5.16   HEMOGLOBIN 14.7   HEMATOCRIT 43.4   MCV 84.1   MCH 28.5   MCHC 33.9   RDW 41.2   PLATELETCT 410   MPV 9.2     Recent Labs     03/11/24 2111 03/12/24  0353   SODIUM 139 137   POTASSIUM 3.2* 3.1*   CHLORIDE 103 102   CO2 23 20   GLUCOSE 110* 135*   BUN 15 10   CREATININE 0.74 0.74   CALCIUM 9.1 8.7     Recent Labs     03/11/24 2111   ASTSGOT 19   ALTSGPT 28   TBILIRUBIN 0.5   ALKPHOSPHAT 128*   GLOBULIN 3.2            IMAGING:  CT-ABDOMEN-PELVIS WITH   Final Result         1.  Mild prominence of the appendix which appears otherwise unremarkable. Radiographic appearance is indeterminate and cannot exclude early appendicitis.   2.  Common bile duct dilatation, commonly associated with postcholecystectomy physiology, consider causes of biliary obstruction with additional workup as clinically appropriate.          ASSESSMENT AND PLAN:  * Appendicitis, acute- (present on admission)  Assessment & Plan  Right lower quadrant abdominal pain  CT scan indeterminate  Elevated white count  3/12 Laparoscopic appendectomy  ACS Blue      When mobilizing and tolerating diet she is cleared to go home.     Home with RX and instructions  No lifting more than 20# x 1 weeks  May shower  Follow up in ACS clinic in 1-2 weeks   Work note provided for one week       ____________________________________     WANG Mata.    DD: 3/12/2024  7:44 AM

## 2024-03-22 ENCOUNTER — OFFICE VISIT (OUTPATIENT)
Dept: SURGERY | Facility: MEDICAL CENTER | Age: 34
End: 2024-03-22
Attending: SURGERY
Payer: MEDICAID

## 2024-03-22 VITALS
HEART RATE: 86 BPM | RESPIRATION RATE: 16 BRPM | BODY MASS INDEX: 35.73 KG/M2 | HEIGHT: 60 IN | SYSTOLIC BLOOD PRESSURE: 112 MMHG | OXYGEN SATURATION: 100 % | DIASTOLIC BLOOD PRESSURE: 73 MMHG | WEIGHT: 182 LBS

## 2024-03-22 DIAGNOSIS — K35.30 ACUTE APPENDICITIS WITH LOCALIZED PERITONITIS, WITHOUT PERFORATION, ABSCESS, OR GANGRENE: ICD-10-CM

## 2024-03-22 PROCEDURE — 99024 POSTOP FOLLOW-UP VISIT: CPT | Performed by: SURGERY

## 2024-03-22 ASSESSMENT — FIBROSIS 4 INDEX: FIB4 SCORE: 0.32

## 2024-03-22 ASSESSMENT — ENCOUNTER SYMPTOMS: ABDOMINAL PAIN: 0

## 2024-03-22 NOTE — PROGRESS NOTES
Subjective     Tavia Dong is a 34 y.o. female who presents with Post-op (Lap appendectomy)            HPI Since DC, doing well. Tolerating diet. Min abd pain. Afebrile    Review of Systems   Gastrointestinal:  Negative for abdominal pain.              Objective     /73 (BP Location: Right arm, Patient Position: Sitting)   Pulse 86   Resp 16   Ht 1.524 m (5')   Wt 82.6 kg (182 lb)   SpO2 100%   BMI 35.54 kg/m²      Physical Exam  Pulmonary:      Effort: Pulmonary effort is normal.   Abdominal:      General: There is no distension.      Palpations: Abdomen is soft.      Tenderness: There is abdominal tenderness.      Comments: Incisions healing well.   Musculoskeletal:         General: Normal range of motion.   Skin:     General: Skin is warm.   Neurological:      General: No focal deficit present.      Mental Status: She is alert.            Pathology - acute appy                Assessment & Plan        SP lap appy    Doing well  FU PRN

## 2024-05-20 NOTE — DISCHARGE INSTRUCTIONS
Return immediately to the Emergency Department if you experience continuing or worsening discomfort in your back and neck, any numbness/weakness/tingling, abdominal pain, fever, chest pain, difficulty breathing or any other new or worsening symptoms.      Muscle Strain  A muscle strain is an injury that occurs when a muscle is stretched beyond its normal length. Usually a small number of muscle fibers are torn when this happens. Muscle strain is rated in degrees. First-degree strains have the least amount of muscle fiber tearing and pain. Second-degree and third-degree strains have increasingly more tearing and pain.   Usually, recovery from muscle strain takes 1-2 weeks. Complete healing takes 5-6 weeks.   CAUSES   Muscle strain happens when a sudden, violent force placed on a muscle stretches it too far. This may occur with lifting, sports, or a fall.   RISK FACTORS  Muscle strain is especially common in athletes.   SIGNS AND SYMPTOMS  At the site of the muscle strain, there may be:  · Pain.  · Bruising.  · Swelling.  · Difficulty using the muscle due to pain or lack of normal function.  DIAGNOSIS   Your health care provider will perform a physical exam and ask about your medical history.  TREATMENT   Often, the best treatment for a muscle strain is resting, icing, and applying cold compresses to the injured area.    HOME CARE INSTRUCTIONS   · Use the PRICE method of treatment to promote muscle healing during the first 2-3 days after your injury. The PRICE method involves:  ¨ Protecting the muscle from being injured again.  ¨ Restricting your activity and resting the injured body part.  ¨ Icing your injury. To do this, put ice in a plastic bag. Place a towel between your skin and the bag. Then, apply the ice and leave it on from 15-20 minutes each hour. After the third day, switch to moist heat packs.  ¨ Apply compression to the injured area with a splint or elastic bandage. Be careful not to wrap it too tightly.  This may interfere with blood circulation or increase swelling.  ¨ Elevate the injured body part above the level of your heart as often as you can.  · Only take over-the-counter or prescription medicines for pain, discomfort, or fever as directed by your health care provider.  · Warming up prior to exercise helps to prevent future muscle strains.  SEEK MEDICAL CARE IF:   · You have increasing pain or swelling in the injured area.  · You have numbness, tingling, or a significant loss of strength in the injured area.  MAKE SURE YOU:   · Understand these instructions.  · Will watch your condition.  · Will get help right away if you are not doing well or get worse.     This information is not intended to replace advice given to you by your health care provider. Make sure you discuss any questions you have with your health care provider.     Document Released: 12/18/2006 Document Revised: 10/08/2014 Document Reviewed: 07/17/2014  [employer]vier Interactive Patient Education ©2016 [employer]     No exercise/No heavy lifting/No sports/gym

## 2024-09-16 ENCOUNTER — OFFICE VISIT (OUTPATIENT)
Dept: URGENT CARE | Facility: CLINIC | Age: 34
End: 2024-09-16
Payer: MEDICAID

## 2024-09-16 VITALS
BODY MASS INDEX: 35.26 KG/M2 | DIASTOLIC BLOOD PRESSURE: 82 MMHG | OXYGEN SATURATION: 100 % | HEIGHT: 60 IN | SYSTOLIC BLOOD PRESSURE: 116 MMHG | HEART RATE: 73 BPM | WEIGHT: 179.6 LBS | TEMPERATURE: 97.8 F | RESPIRATION RATE: 16 BRPM

## 2024-09-16 DIAGNOSIS — R05.1 ACUTE COUGH: ICD-10-CM

## 2024-09-16 DIAGNOSIS — J01.90 ACUTE NON-RECURRENT SINUSITIS, UNSPECIFIED LOCATION: ICD-10-CM

## 2024-09-16 PROCEDURE — 3074F SYST BP LT 130 MM HG: CPT | Performed by: PHYSICIAN ASSISTANT

## 2024-09-16 PROCEDURE — 87637 SARSCOV2&INF A&B&RSV AMP PRB: CPT | Mod: QW | Performed by: PHYSICIAN ASSISTANT

## 2024-09-16 PROCEDURE — 3079F DIAST BP 80-89 MM HG: CPT | Performed by: PHYSICIAN ASSISTANT

## 2024-09-16 PROCEDURE — 99214 OFFICE O/P EST MOD 30 MIN: CPT | Performed by: PHYSICIAN ASSISTANT

## 2024-09-16 RX ORDER — DEXTROMETHORPHAN HYDROBROMIDE AND PROMETHAZINE HYDROCHLORIDE 15; 6.25 MG/5ML; MG/5ML
5 SYRUP ORAL EVERY 4 HOURS PRN
Qty: 120 ML | Refills: 0 | Status: SHIPPED | OUTPATIENT
Start: 2024-09-16

## 2024-09-16 ASSESSMENT — ENCOUNTER SYMPTOMS
TROUBLE SWALLOWING: 0
SWOLLEN GLANDS: 0
HEADACHES: 1
HOARSE VOICE: 1
COUGH: 1

## 2024-09-16 ASSESSMENT — FIBROSIS 4 INDEX: FIB4 SCORE: 0.32

## 2024-09-16 NOTE — PROGRESS NOTES
Subjective:   Tavia Dong is a 34 y.o. female who presents for Body Aches (X1week. Fatigue, chills. Pt was exposed to covid at work)        Pharyngitis   This is a new problem. The current episode started in the past 7 days. The problem has been unchanged. There has been no fever. The pain is mild. Associated symptoms include congestion, coughing, headaches and a hoarse voice. Pertinent negatives include no drooling, ear pain, swollen glands or trouble swallowing. Associated symptoms comments: fatigue. She has had no exposure to strep or mono. Exposure to: Covid 19. Treatments tried: tylenol severe cold. The treatment provided mild relief.     Review of Systems   HENT:  Positive for congestion and hoarse voice. Negative for drooling, ear pain and trouble swallowing.    Respiratory:  Positive for cough.    Neurological:  Positive for headaches.       PMH:  has a past medical history of GERD (gastroesophageal reflux disease) and Headache(784.0).    She has no past medical history of Addisons disease ([facility]), Adrenal disorder ([facility]), Allergy, unspecified not elsewhere classified, Anemia, Anxiety, Arrhythmia, Arthritis, Asthma, Asymptomatic human immunodeficiency virus (HIV) infection status ([facility]), Cancer ([facility]), CHF (congestive heart failure) ([facility]), Chronic airway obstruction, not elsewhere classified, Clotting disorder ([facility]), Cushings syndrome ([facility]), Depression, Diabetic neuropathy ([facility]), Emphysema, Glaucoma, Goiter, Headache, classical migraine, Heart attack ([facility]), Heart murmur, Hyperlipidemia, Hypertension, IBD (inflammatory bowel disease), Kidney disease, Meningitis, Osteoporosis, unspecified, Parathyroid disorder ([facility]), Pituitary disease ([facility]), Seizure ([facility]), Sickle cell disease ([facility]), Stroke ([facility]), Substance abuse ([facility]), Thyroid disease, Tuberculosis, Type II or unspecified type diabetes mellitus without mention of complication, not stated as uncontrolled, Ulcer, Unspecified cataract, or Urinary tract  infection, site not specified.  MEDS:   Current Outpatient Medications:     amoxicillin-clavulanate (AUGMENTIN) 875-125 MG Tab, Take 1 Tablet by mouth 2 times a day for 7 days., Disp: 14 Tablet, Rfl: 0    promethazine-dextromethorphan (PROMETHAZINE-DM) 6.25-15 MG/5ML syrup, Take 5 mL by mouth every four hours as needed for Cough., Disp: 120 mL, Rfl: 0    acetaminophen (TYLENOL) 500 MG Tab, Take 2 Tablets by mouth every 6 hours as needed for Mild Pain or Moderate Pain., Disp: , Rfl:     amitriptyline (ELAVIL) 25 MG Tab, TAKE 1 TABLET BY MOUTH AT BEDTIME FOR HEADACHE PREVENTION, Disp: , Rfl:     hydrOXYzine HCl (ATARAX) 25 MG Tab, TAKE 1 TO 4 TABLETS BY MOUTH EVERY DAY AS NEEDED FOR ANXIETY OR INSOMNIA, Disp: , Rfl:     baclofen (LIORESAL) 10 MG Tab, TAKE 1 TABLET BY MOUTH AT BEDTIME FOR SPASMS OR NECK PAIN (Patient not taking: Reported on 9/16/2024), Disp: , Rfl:     sertraline (ZOLOFT) 25 MG tablet, Take 25 mg by mouth every day. FOR ANXIETY (Patient not taking: Reported on 9/16/2024), Disp: , Rfl:     benzonatate (TESSALON) 100 MG Cap, Take 1 Capsule by mouth 3 times a day as needed for Cough. (Patient not taking: Reported on 9/16/2024), Disp: 30 Capsule, Rfl: 0    methylPREDNISolone (MEDROL DOSEPAK) 4 MG Tablet Therapy Pack, Follow schedule on package instructions. (Patient not taking: Reported on 9/16/2024), Disp: 21 Tablet, Rfl: 0    ketorolac (TORADOL) 10 MG Tab, Take 1 Tablet by mouth 3 times a day as needed for Moderate Pain. With food (Patient not taking: Reported on 9/16/2024), Disp: 15 Tablet, Rfl: 0  ALLERGIES: No Known Allergies  SURGHX:   Past Surgical History:   Procedure Laterality Date    MI LAP,APPENDECTOMY N/A 3/12/2024    Procedure: APPENDECTOMY, LAPAROSCOPIC;  Surgeon: Ivan Caceres M.D.;  Location: SURGERY [facility];  Service: General    CHOLECYSTECTOMY  2013    due to gall stones.     SOCHX:  reports that she has never smoked. She has never used smokeless tobacco. She reports that she  does not drink alcohol and does not use drugs.  FH: Family history was reviewed, no pertinent findings to report   Objective:   /82   Pulse 73   Temp 36.6 °C (97.8 °F) (Temporal)   Resp 16   Ht 1.524 m (5')   Wt 81.5 kg (179 lb 9.6 oz)   SpO2 100%   BMI 35.08 kg/m²   Physical Exam  Vitals reviewed.   Constitutional:       General: She is not in acute distress.     Appearance: Normal appearance. She is well-developed. She is not toxic-appearing.   HENT:      Head: Normocephalic and atraumatic.      Right Ear: Tympanic membrane, ear canal and external ear normal.      Left Ear: Tympanic membrane, ear canal and external ear normal.      Nose: Congestion and rhinorrhea present.      Mouth/Throat:      Lips: Pink.      Mouth: Mucous membranes are moist.      Pharynx: Oropharynx is clear. Uvula midline. No oropharyngeal exudate, posterior oropharyngeal erythema or uvula swelling.   Cardiovascular:      Rate and Rhythm: Normal rate and regular rhythm.      Heart sounds: Normal heart sounds, S1 normal and S2 normal.   Pulmonary:      Effort: Pulmonary effort is normal. No respiratory distress.      Breath sounds: Normal breath sounds. No stridor. No decreased breath sounds, wheezing, rhonchi or rales.   Lymphadenopathy:      Cervical: No cervical adenopathy.      Right cervical: No superficial cervical adenopathy.     Left cervical: No superficial cervical adenopathy.   Skin:     General: Skin is dry.   Neurological:      Comments: Alert and oriented.    Psychiatric:         Speech: Speech normal.         Behavior: Behavior normal.           Results for orders placed or performed in visit on 09/16/24   POCT CoV-2, Flu A/B, RSV by PCR   Result Value Ref Range    SARS-CoV-2 by PCR Negative Negative, Invalid    Influenza virus A RNA Negative Negative, Invalid    Influenza virus B, PCR Negative Negative, Invalid    RSV, PCR Negative Negative, Invalid       Assessment/Plan:   1. Acute non-recurrent sinusitis,  unspecified location  - POCT CoV-2, Flu A/B, RSV by PCR  - amoxicillin-clavulanate (AUGMENTIN) 875-125 MG Tab; Take 1 Tablet by mouth 2 times a day for 7 days.  Dispense: 14 Tablet; Refill: 0    2. Acute cough  - promethazine-dextromethorphan (PROMETHAZINE-DM) 6.25-15 MG/5ML syrup; Take 5 mL by mouth every four hours as needed for Cough.  Dispense: 120 mL; Refill: 0    Considerations include but not limited to sinusitis, allergic rhinitis, viral URI, bronchitis, pneumonia.  History and exam today consistent with sinusitis.  Given duration of progression of symptoms I suspect that this is bacterial in nature.    Patient started on antibiotic therapy.  Recommend taking these with food to avoid GI upset.  Concurrent use of probiotic may also be beneficial.    Flonase 1 squirt in each nostril, as instructed in clinic, once a day.  Use nasal saline TID to promote drainage.   Salt water gurgles to soothe sore throat.  Ayr saline gel to moisturize nasal passage and prevent bleeding.  Try to use sudafed sparingly in order to allow sinuses to drain.  Avoid the longer formulations and try to take only in the morning and/or at noon if needed.  If you fail to improve in 3-5 days or symptoms worsen/new symptoms develop, RTC for reevaluation

## 2024-09-16 NOTE — RESULT ENCOUNTER NOTE
Trevin Squires,    Your testing was negative, fortunately.  Given how long you have been sick I am concerned that you may be developing a sinus infection.  I sent in some nasal spray and a cough syrup to help your symptoms.  However if your symptoms persist for another 48 hours without improvement I do recommend that you begin the antibiotic that I sent in to your pharmacy as well.  I hope you feel better soon!    Kind regards,  Fish

## (undated) DEVICE — TUBING CLEARLINK DUO-VENT - C-FLO (48EA/CA)

## (undated) DEVICE — COVER LIGHT HANDLE ALC PLUS DISP (18EA/BX)

## (undated) DEVICE — PACK LAP CHOLE OR - (2EA/CA)

## (undated) DEVICE — GLOVE BIOGEL INDICATOR SZ 8.5 SURGICAL PF LTX - (50/BX 4BX/CA)

## (undated) DEVICE — SLEEVE, VASO, THIGH, MED

## (undated) DEVICE — GLOVE BIOGEL PI INDICATOR SZ 7.5 SURGICAL PF LF -(50/BX 4BX/CA)

## (undated) DEVICE — LACTATED RINGERS INJ 1000 ML - (14EA/CA 60CA/PF)

## (undated) DEVICE — SENSOR OXIMETER ADULT SPO2 RD SET (20EA/BX)

## (undated) DEVICE — SET EXTENSION WITH 2 PORTS (48EA/CA) ***PART #2C8610 IS A SUBSTITUTE*****

## (undated) DEVICE — GOWN WARMING STANDARD FLEX - (30/CA)

## (undated) DEVICE — BAG RETRIEVAL 10ML (10EA/BX)

## (undated) DEVICE — SUTURE GENERAL

## (undated) DEVICE — SUTURE 4-0 MONOCRYL PLUS PS-2 - 27 INCH (36/BX)

## (undated) DEVICE — TROCAR 5X100 BLADED Z-THREAD - KII (6/BX)

## (undated) DEVICE — CANISTER SUCTION 3000ML MECHANICAL FILTER AUTO SHUTOFF MEDI-VAC NONSTERILE LF DISP  (40EA/CA)

## (undated) DEVICE — TUBE E-T HI-LO CUFF 7.0MM (10EA/PK)

## (undated) DEVICE — ENDO PEANUT 5MM DEVICE (12EA/BX)

## (undated) DEVICE — SODIUM CHL IRRIGATION 0.9% 1000ML (12EA/CA)

## (undated) DEVICE — STAPLER 45MM ARTICULATING - ENDO (3EA/BX)

## (undated) DEVICE — GOWN SURGICAL XX-LARGE - (28EA/CA) SIRUS NON REINFORCED

## (undated) DEVICE — SET LEADWIRE 5 LEAD BEDSIDE DISPOSABLE ECG (1SET OF 5/EA)

## (undated) DEVICE — SUTURE 0 VICRYL PLUS UR-6 - 27 INCH (36/BX)

## (undated) DEVICE — GLOVE BIOGEL SZ 8.5 SURGICAL PF LTX - (50PR/BX 4BX/CA)

## (undated) DEVICE — CANNULA W/SEAL 5X100 Z-THRE - ADED KII (12/BX)

## (undated) DEVICE — GLOVE SZ 7 BIOGEL PI MICRO - PF LF (50PR/BX 4BX/CA)

## (undated) DEVICE — TROCAR LAPSCP 100MM 12MM NTHRD - (6/BX)

## (undated) DEVICE — DERMABOND ADVANCED - (12EA/BX)

## (undated) DEVICE — STAPLE 45MM VASCULAR WHITE 2.5MM (12EA/BX)

## (undated) DEVICE — SUCTION INSTRUMENT YANKAUER BULBOUS TIP W/O VENT (50EA/CA)

## (undated) DEVICE — SEALER VESSEL HARMONIC ACE PLUS WITH ADVANCED HEMOSTASIS 36CM (1/EA)

## (undated) DEVICE — CHLORAPREP 26 ML APPLICATOR - ORANGE TINT(25/CA)